# Patient Record
Sex: FEMALE | Race: WHITE | NOT HISPANIC OR LATINO | Employment: OTHER | URBAN - METROPOLITAN AREA
[De-identification: names, ages, dates, MRNs, and addresses within clinical notes are randomized per-mention and may not be internally consistent; named-entity substitution may affect disease eponyms.]

---

## 2021-01-07 ENCOUNTER — HOSPITAL ENCOUNTER (OUTPATIENT)
Dept: LAB | Facility: MEDICAL CENTER | Age: 86
End: 2021-01-07
Attending: FAMILY MEDICINE
Payer: MEDICARE

## 2021-01-07 LAB
ALBUMIN SERPL BCP-MCNC: 4 G/DL (ref 3.2–4.9)
ALBUMIN/GLOB SERPL: 1.1 G/DL
ALP SERPL-CCNC: 74 U/L (ref 30–99)
ALT SERPL-CCNC: 16 U/L (ref 2–50)
ANION GAP SERPL CALC-SCNC: 11 MMOL/L (ref 7–16)
AST SERPL-CCNC: 19 U/L (ref 12–45)
BILIRUB SERPL-MCNC: 0.9 MG/DL (ref 0.1–1.5)
BUN SERPL-MCNC: 19 MG/DL (ref 8–22)
CALCIUM SERPL-MCNC: 10.4 MG/DL (ref 8.5–10.5)
CHLORIDE SERPL-SCNC: 100 MMOL/L (ref 96–112)
CHOLEST SERPL-MCNC: 167 MG/DL (ref 100–199)
CO2 SERPL-SCNC: 21 MMOL/L (ref 20–33)
CREAT SERPL-MCNC: 0.86 MG/DL (ref 0.5–1.4)
FASTING STATUS PATIENT QL REPORTED: NORMAL
GLOBULIN SER CALC-MCNC: 3.5 G/DL (ref 1.9–3.5)
GLUCOSE SERPL-MCNC: 81 MG/DL (ref 65–99)
HDLC SERPL-MCNC: 53 MG/DL
LDLC SERPL CALC-MCNC: 96 MG/DL
POTASSIUM SERPL-SCNC: 3.8 MMOL/L (ref 3.6–5.5)
PROT SERPL-MCNC: 7.5 G/DL (ref 6–8.2)
SODIUM SERPL-SCNC: 132 MMOL/L (ref 135–145)
TRIGL SERPL-MCNC: 92 MG/DL (ref 0–149)

## 2021-01-07 PROCEDURE — 80061 LIPID PANEL: CPT

## 2021-01-07 PROCEDURE — 80053 COMPREHEN METABOLIC PANEL: CPT

## 2021-01-07 PROCEDURE — 36415 COLL VENOUS BLD VENIPUNCTURE: CPT

## 2021-01-14 DIAGNOSIS — Z23 NEED FOR VACCINATION: ICD-10-CM

## 2023-01-09 ENCOUNTER — TELEPHONE (OUTPATIENT)
Dept: SCHEDULING | Facility: IMAGING CENTER | Age: 88
End: 2023-01-09
Payer: MEDICARE

## 2023-01-11 ENCOUNTER — OFFICE VISIT (OUTPATIENT)
Dept: MEDICAL GROUP | Facility: PHYSICIAN GROUP | Age: 88
End: 2023-01-11
Payer: MEDICARE

## 2023-01-11 VITALS
DIASTOLIC BLOOD PRESSURE: 86 MMHG | BODY MASS INDEX: 22.36 KG/M2 | TEMPERATURE: 98.6 F | HEART RATE: 100 BPM | SYSTOLIC BLOOD PRESSURE: 154 MMHG | HEIGHT: 64 IN | RESPIRATION RATE: 14 BRPM | OXYGEN SATURATION: 91 % | WEIGHT: 131 LBS

## 2023-01-11 DIAGNOSIS — E55.9 VITAMIN D DEFICIENCY: ICD-10-CM

## 2023-01-11 DIAGNOSIS — N18.31 STAGE 3A CHRONIC KIDNEY DISEASE: ICD-10-CM

## 2023-01-11 DIAGNOSIS — I10 PRIMARY HYPERTENSION: ICD-10-CM

## 2023-01-11 DIAGNOSIS — Z13.29 SCREENING FOR THYROID DISORDER: ICD-10-CM

## 2023-01-11 DIAGNOSIS — Z13.1 DIABETES MELLITUS SCREENING: ICD-10-CM

## 2023-01-11 DIAGNOSIS — Z13.6 ENCOUNTER FOR LIPID SCREENING FOR CARDIOVASCULAR DISEASE: ICD-10-CM

## 2023-01-11 DIAGNOSIS — Z13.21 ENCOUNTER FOR VITAMIN DEFICIENCY SCREENING: ICD-10-CM

## 2023-01-11 DIAGNOSIS — Z13.220 ENCOUNTER FOR LIPID SCREENING FOR CARDIOVASCULAR DISEASE: ICD-10-CM

## 2023-01-11 DIAGNOSIS — E78.00 PURE HYPERCHOLESTEROLEMIA: ICD-10-CM

## 2023-01-11 DIAGNOSIS — R13.19 ESOPHAGEAL DYSPHAGIA: ICD-10-CM

## 2023-01-11 DIAGNOSIS — Q21.12 PFO (PATENT FORAMEN OVALE): ICD-10-CM

## 2023-01-11 PROBLEM — K40.90 RIGHT INGUINAL HERNIA: Status: ACTIVE | Noted: 2021-10-27

## 2023-01-11 PROBLEM — I87.2 VENOUS INSUFFICIENCY: Status: ACTIVE | Noted: 2021-10-26

## 2023-01-11 PROBLEM — R26.89 BALANCE PROBLEMS: Status: ACTIVE | Noted: 2021-10-27

## 2023-01-11 PROBLEM — I35.1 AORTIC REGURGITATION: Status: ACTIVE | Noted: 2021-10-26

## 2023-01-11 PROBLEM — N18.9 CKD (CHRONIC KIDNEY DISEASE): Status: ACTIVE | Noted: 2021-10-26

## 2023-01-11 PROCEDURE — 99204 OFFICE O/P NEW MOD 45 MIN: CPT | Performed by: INTERNAL MEDICINE

## 2023-01-11 RX ORDER — OLMESARTAN MEDOXOMIL AND HYDROCHLOROTHIAZIDE 40/25 40; 25 MG/1; MG/1
1 TABLET ORAL
COMMUNITY
Start: 2022-10-10 | End: 2023-10-12

## 2023-01-11 ASSESSMENT — PATIENT HEALTH QUESTIONNAIRE - PHQ9: CLINICAL INTERPRETATION OF PHQ2 SCORE: 0

## 2023-01-12 NOTE — PROGRESS NOTES
New Patient to establish    Chief Complaint   Patient presents with    Establish Care       Subjective:     History of Present Illness: Patient is a 89 y.o. female who is here today to establish primary care    Current Outpatient Medications on File Prior to Visit   Medication Sig Dispense Refill    olmesartan-hydrochlorothiazide (BENICAR HCT) 40-25 MG per tablet Take 1 Tablet by mouth every day.      furosemide (LASIX) 20 MG Tab Take 1 Tab by mouth 1 time daily as needed (as needed for swelling.). 30 Tab 0    ibuprofen (MOTRIN) 200 MG Tab Take 200 mg by mouth every 6 hours as needed.      Loratadine (CLARITIN PO) Take  by mouth.       No current facility-administered medications on file prior to visit.     Allergies   Allergen Reactions    Pcn [Penicillins]      Patient Active Problem List    Diagnosis Date Noted    Esophageal dysphagia 01/11/2023    Vitamin D deficiency 01/11/2023    Balance problems 10/27/2021    Right inguinal hernia 10/27/2021    Aortic regurgitation 10/26/2021    CKD (chronic kidney disease) 10/26/2021    PFO (patent foramen ovale) 10/26/2021    Venous insufficiency 10/26/2021    Bilateral edema of lower extremity 01/07/2016    Osteoarthritis 08/12/2014    Hyperlipidemia 08/12/2014    Nocturnal hypoxia 07/29/2014    Back pain 07/22/2014    Peripheral edema 04/27/2011    Seasonal allergies 04/27/2011    HTN (hypertension) 03/28/2011    History of breast cancer 03/28/2011     Past Medical History:   Diagnosis Date    Allergy     History of breast cancer      Hyperlipidemia     Hypertension August 2014    Echocardiogram with normal LV size, LVEF 65%. Mild AR, mild MR, mild TR.    Lymphedema of arm     Nocturnal hypoxia     Palpitations      Peripheral edema      Past Surgical History:   Procedure Laterality Date    ABDOMINAL HYSTERECTOMY TOTAL      non-cancer    LUMPECTOMY      breast cancer     Family History   Problem Relation Age of Onset    Other Mother         aneurysm    Heart Disease  "Father     Cancer Paternal Aunt         breast    Diabetes Neg Hx     Hyperlipidemia Neg Hx      Social History     Tobacco Use    Smoking status: Never    Smokeless tobacco: Never   Substance Use Topics    Alcohol use: No     Alcohol/week: 0.0 oz     Comment: none in decades    Drug use: No       ROS:  All other systems reviewed and are negative except as stated in the HPI       Physical Exam:     BP (!) 154/86 (BP Location: Left arm, Patient Position: Sitting, BP Cuff Size: Adult)   Pulse 100   Temp 37 °C (98.6 °F) (Temporal)   Resp 14   Ht 1.626 m (5' 4\")   Wt 59.4 kg (131 lb)   SpO2 91%   BMI 22.49 kg/m²   General: Normal appearing. No distress.  Pulmonary: Clear to ausculation.  Normal effort.   Cardiovascular: Regular rate and rhythm    Assessment and Plan:     2. Esophageal dysphagia  This is chronic, reported has work-up done in [facility] state status post esophageal dilatation, reported was seen by one of the GI office, she was very upset from that provider, she does not want to go back to that same office, she is open to the other GI office,  > Also reported some dark stool, denied having melena per se, she needs to have endoscopy to rule out any upper GI issues  -Few times she has some epigastric pain, as well as regurgitation, denied having acid reflux    > I will refer patient to GI office that she likes    Esophagogram in 2022   Mild narrowing of distal esophagus at the GE junction which did not permit the passage of 13 mm barium tablet.  Endoscopy should be considered.     - Comp Metabolic Panel; Future    3. Stage 3a chronic kidney disease (HCC)  - Comp Metabolic Panel; Future    4. Primary hypertension  She is checking blood pressure at home with normal range, she is taking olmesartan-hydrochlorothiazide 40-25 mg daily, she is also seeing cardiology, denies any symptoms related  - Comp Metabolic Panel; Future  - TSH WITH REFLEX TO FT4; Future    5. Pure hypercholesterolemia  - Lipid Profile; " Future    6. Vitamin D deficiency  - VITAMIN D,25 HYDROXY (DEFICIENCY); Future    7. Screening for thyroid disorder  - TSH WITH REFLEX TO FT4; Future    8. Encounter for vitamin deficiency screening  - VITAMIN B12; Future    Follow Up:      Return in about 4 weeks (around 2/8/2023) for labs.    Please note that this dictation was created using voice recognition software. I have made every reasonable attempt to correct obvious errors, but I expect that there are errors of grammar and possibly content that I did not discover before finalizing the note.    Signed by: Frederick Mack M.D.

## 2023-05-21 ENCOUNTER — TELEPHONE (OUTPATIENT)
Dept: MEDICAL GROUP | Facility: PHYSICIAN GROUP | Age: 88
End: 2023-05-21
Payer: MEDICARE

## 2023-05-21 NOTE — TELEPHONE ENCOUNTER
Phone Number Called: [phone] and  [phone]  Call outcome:  called patient to re schedule appt for 05/26/2023 [phone] disconnected  Unable to leave message on  [phone] voice mail not set up.    If patient calls back please re schedule patient for a different date.

## 2023-06-08 ENCOUNTER — DOCUMENTATION (OUTPATIENT)
Dept: HEALTH INFORMATION MANAGEMENT | Facility: OTHER | Age: 88
End: 2023-06-08
Payer: MEDICARE

## 2023-07-10 ENCOUNTER — OFFICE VISIT (OUTPATIENT)
Dept: MEDICAL GROUP | Facility: PHYSICIAN GROUP | Age: 88
End: 2023-07-10
Payer: MEDICARE

## 2023-07-10 VITALS
SYSTOLIC BLOOD PRESSURE: 122 MMHG | HEART RATE: 86 BPM | DIASTOLIC BLOOD PRESSURE: 76 MMHG | HEIGHT: 64 IN | OXYGEN SATURATION: 97 % | BODY MASS INDEX: 23.63 KG/M2 | TEMPERATURE: 97.6 F | WEIGHT: 138.4 LBS | RESPIRATION RATE: 16 BRPM

## 2023-07-10 DIAGNOSIS — E78.00 PURE HYPERCHOLESTEROLEMIA: ICD-10-CM

## 2023-07-10 DIAGNOSIS — N18.31 STAGE 3A CHRONIC KIDNEY DISEASE: ICD-10-CM

## 2023-07-10 DIAGNOSIS — J31.0 CHRONIC RHINITIS: ICD-10-CM

## 2023-07-10 DIAGNOSIS — Z85.3 HISTORY OF BREAST CANCER: ICD-10-CM

## 2023-07-10 DIAGNOSIS — I10 PRIMARY HYPERTENSION: ICD-10-CM

## 2023-07-10 PROCEDURE — 3074F SYST BP LT 130 MM HG: CPT | Performed by: NURSE PRACTITIONER

## 2023-07-10 PROCEDURE — 99214 OFFICE O/P EST MOD 30 MIN: CPT | Performed by: NURSE PRACTITIONER

## 2023-07-10 PROCEDURE — 3078F DIAST BP <80 MM HG: CPT | Performed by: NURSE PRACTITIONER

## 2023-07-10 RX ORDER — TRIAMCINOLONE ACETONIDE 40 MG/ML
40 INJECTION, SUSPENSION INTRA-ARTICULAR; INTRAMUSCULAR ONCE
Status: COMPLETED | OUTPATIENT
Start: 2023-07-10 | End: 2023-07-10

## 2023-07-10 RX ADMIN — TRIAMCINOLONE ACETONIDE 40 MG: 40 INJECTION, SUSPENSION INTRA-ARTICULAR; INTRAMUSCULAR at 11:57

## 2023-07-10 ASSESSMENT — FIBROSIS 4 INDEX: FIB4 SCORE: 0.96

## 2023-07-10 NOTE — ASSESSMENT & PLAN NOTE
Patient reports that when she was living in [place] she had a lumpectomy with lymph node removal.

## 2023-07-10 NOTE — ASSESSMENT & PLAN NOTE
Chronic unstable. Patient has had chronic rhinitis and post nasal drip. She recently saw an ENT and was provided with antibiotics.  Patient stopped antibiotics due to diarrhea.  Patient reports in the past she has seen an allergist who is giving her a Kenalog shot every 6 months.  Patient reports that her allergies responded well to this.    We will give patient Kenalog shot in office today.

## 2023-07-10 NOTE — ASSESSMENT & PLAN NOTE
Chronic stable. Patient has CKD stage 3 due to age. Her most recent labs are stable.     Will continue to monitor at least yearly.

## 2023-07-10 NOTE — ASSESSMENT & PLAN NOTE
Chronic stable condition.  Patient blood pressure is stable in office today.  Patient denies any chest pain, palpitations.    Continue olmesartan-hydrochlorothiazide 40-25 mg daily.

## 2023-07-10 NOTE — PROGRESS NOTES
"  Chief Complaint   Patient presents with    Establish Care     New to you     Gastrophageal Reflux    Body Aches     Joint pain since the 01/01/23 its gotten worse     Seasonal Allergies     Itching eyes, nose, bilateral ear itching x 4 months                                                                                                                                        HPI:   Carie presents today with the following.    Problem   Chronic Rhinitis   Ckd (Chronic Kidney Disease)   Hyperlipidemia   Htn (Hypertension)   History of Breast Cancer   Nocturnal Hypoxia (Resolved)       Current Outpatient Medications   Medication Sig Dispense Refill    olmesartan-hydrochlorothiazide (BENICAR HCT) 40-25 MG per tablet Take 1 Tablet by mouth every day.      Loratadine (CLARITIN PO) Take  by mouth.       No current facility-administered medications for this visit.       Allergies as of 07/10/2023 - Reviewed 07/10/2023   Allergen Reaction Noted    Pcn [penicillins]  07/14/2009        ROS:  All systems negative expect as addressed in assessment and plan.     /76 (BP Location: Left arm, Patient Position: Sitting, BP Cuff Size: Adult)   Pulse 86   Temp 36.4 °C (97.6 °F) (Temporal)   Resp 16   Ht 1.626 m (5' 4\")   Wt 62.8 kg (138 lb 6.4 oz)   SpO2 97%   BMI 23.76 kg/m²       Physical Exam  Vitals reviewed.   Constitutional:       Appearance: Normal appearance.   HENT:      Head: Normocephalic and atraumatic.      Right Ear: Tympanic membrane and ear canal normal.      Left Ear: Tympanic membrane and ear canal normal.      Mouth/Throat:      Mouth: Mucous membranes are moist.   Eyes:      Extraocular Movements: Extraocular movements intact.      Conjunctiva/sclera: Conjunctivae normal.   Pulmonary:      Effort: Pulmonary effort is normal.   Musculoskeletal:         General: Normal range of motion.      Cervical back: Normal range of motion.   Skin:     General: Skin is warm and dry.   Neurological:      General: No " focal deficit present.      Mental Status: She is alert and oriented to person, place, and time.   Psychiatric:         Mood and Affect: Mood normal.         Behavior: Behavior normal.         Thought Content: Thought content normal.           Assessment and Plan:  89 y.o. female with the following issues.    1. History of breast cancer        2. Chronic rhinitis  triamcinolone acetonide (Kenalog-40) injection 40 mg      3. Stage 3a chronic kidney disease (HCC)        4. Pure hypercholesterolemia        5. Primary hypertension             History of breast cancer  Patient reports that when she was living in [place] she had a lumpectomy with lymph node removal.       Chronic rhinitis  Chronic unstable. Patient has had chronic rhinitis and post nasal drip. She recently saw an ENT and was provided with antibiotics.  Patient stopped antibiotics due to diarrhea.  Patient reports in the past she has seen an allergist who is giving her a Kenalog shot every 6 months.  Patient reports that her allergies responded well to this.    We will give patient Kenalog shot in office today.    CKD (chronic kidney disease)  Chronic stable. Patient has CKD stage 3 due to age. Her most recent labs are stable.     Will continue to monitor at least yearly.     Hyperlipidemia  Chronic stable.  Patient follows with cardiology, Dr. Fabian.  Patient brought in most recent labs.    Labs and most recent note from Dr. Fabian reviewed.    Patient to continue following with cardiology.    HTN (hypertension)  Chronic stable condition.  Patient blood pressure is stable in office today.  Patient denies any chest pain, palpitations.    Continue olmesartan-hydrochlorothiazide 40-25 mg daily.      Return in about 6 months (around 1/10/2024) for follow up , Chronic Health Conditions.      Please note that this dictation was created using voice recognition software. I have worked with consultants from the vendor as well as technical experts from  [facility] to optimize the interface. I have made every reasonable attempt to correct obvious errors, but I expect that there are errors of grammar and possibly content that I did not discover before finalizing the note.

## 2023-07-10 NOTE — ASSESSMENT & PLAN NOTE
Chronic stable.  Patient follows with cardiology, Dr. Fabian.  Patient brought in most recent labs.    Labs and most recent note from Dr. Fabian reviewed.    Patient to continue following with cardiology.

## 2023-10-11 ENCOUNTER — HOSPITAL ENCOUNTER (INPATIENT)
Facility: MEDICAL CENTER | Age: 88
LOS: 2 days | DRG: 176 | End: 2023-10-14
Attending: STUDENT IN AN ORGANIZED HEALTH CARE EDUCATION/TRAINING PROGRAM | Admitting: STUDENT IN AN ORGANIZED HEALTH CARE EDUCATION/TRAINING PROGRAM
Payer: MEDICARE

## 2023-10-11 ENCOUNTER — APPOINTMENT (OUTPATIENT)
Dept: RADIOLOGY | Facility: MEDICAL CENTER | Age: 88
DRG: 176 | End: 2023-10-11
Attending: STUDENT IN AN ORGANIZED HEALTH CARE EDUCATION/TRAINING PROGRAM
Payer: MEDICARE

## 2023-10-11 DIAGNOSIS — I26.99 ACUTE PULMONARY EMBOLISM, UNSPECIFIED PULMONARY EMBOLISM TYPE, UNSPECIFIED WHETHER ACUTE COR PULMONALE PRESENT (HCC): ICD-10-CM

## 2023-10-11 LAB
ALBUMIN SERPL BCP-MCNC: 3.8 G/DL (ref 3.2–4.9)
ALBUMIN/GLOB SERPL: 1 G/DL
ALP SERPL-CCNC: 77 U/L (ref 30–99)
ALT SERPL-CCNC: 8 U/L (ref 2–50)
ANION GAP SERPL CALC-SCNC: 13 MMOL/L (ref 7–16)
AST SERPL-CCNC: 15 U/L (ref 12–45)
BASOPHILS # BLD AUTO: 0.4 % (ref 0–1.8)
BASOPHILS # BLD: 0.07 K/UL (ref 0–0.12)
BILIRUB SERPL-MCNC: 1.2 MG/DL (ref 0.1–1.5)
BUN SERPL-MCNC: 40 MG/DL (ref 8–22)
CALCIUM ALBUM COR SERPL-MCNC: 10.6 MG/DL (ref 8.5–10.5)
CALCIUM SERPL-MCNC: 10.4 MG/DL (ref 8.5–10.5)
CHLORIDE SERPL-SCNC: 98 MMOL/L (ref 96–112)
CO2 SERPL-SCNC: 22 MMOL/L (ref 20–33)
CREAT SERPL-MCNC: 1.8 MG/DL (ref 0.5–1.4)
D DIMER PPP IA.FEU-MCNC: 3.96 UG/ML (FEU) (ref 0–0.5)
EKG IMPRESSION: NORMAL
EOSINOPHIL # BLD AUTO: 0.33 K/UL (ref 0–0.51)
EOSINOPHIL NFR BLD: 2 % (ref 0–6.9)
ERYTHROCYTE [DISTWIDTH] IN BLOOD BY AUTOMATED COUNT: 42.9 FL (ref 35.9–50)
GFR SERPLBLD CREATININE-BSD FMLA CKD-EPI: 26 ML/MIN/1.73 M 2
GLOBULIN SER CALC-MCNC: 3.7 G/DL (ref 1.9–3.5)
GLUCOSE SERPL-MCNC: 123 MG/DL (ref 65–99)
HCT VFR BLD AUTO: 34.7 % (ref 37–47)
HGB BLD-MCNC: 11.4 G/DL (ref 12–16)
IMM GRANULOCYTES # BLD AUTO: 0.11 K/UL (ref 0–0.11)
IMM GRANULOCYTES NFR BLD AUTO: 0.7 % (ref 0–0.9)
LIPASE SERPL-CCNC: 25 U/L (ref 11–82)
LYMPHOCYTES # BLD AUTO: 3.47 K/UL (ref 1–4.8)
LYMPHOCYTES NFR BLD: 21 % (ref 22–41)
MAGNESIUM SERPL-MCNC: 1.9 MG/DL (ref 1.5–2.5)
MCH RBC QN AUTO: 29.5 PG (ref 27–33)
MCHC RBC AUTO-ENTMCNC: 32.9 G/DL (ref 32.2–35.5)
MCV RBC AUTO: 89.7 FL (ref 81.4–97.8)
MONOCYTES # BLD AUTO: 1.72 K/UL (ref 0–0.85)
MONOCYTES NFR BLD AUTO: 10.4 % (ref 0–13.4)
NEUTROPHILS # BLD AUTO: 10.81 K/UL (ref 1.82–7.42)
NEUTROPHILS NFR BLD: 65.5 % (ref 44–72)
NRBC # BLD AUTO: 0 K/UL
NRBC BLD-RTO: 0 /100 WBC (ref 0–0.2)
PLATELET # BLD AUTO: 240 K/UL (ref 164–446)
PMV BLD AUTO: 9.6 FL (ref 9–12.9)
POTASSIUM SERPL-SCNC: 3.4 MMOL/L (ref 3.6–5.5)
PROT SERPL-MCNC: 7.5 G/DL (ref 6–8.2)
RBC # BLD AUTO: 3.87 M/UL (ref 4.2–5.4)
SODIUM SERPL-SCNC: 133 MMOL/L (ref 135–145)
TROPONIN T SERPL-MCNC: 17 NG/L (ref 6–19)
TROPONIN T SERPL-MCNC: 19 NG/L (ref 6–19)
WBC # BLD AUTO: 16.5 K/UL (ref 4.8–10.8)

## 2023-10-11 PROCEDURE — 83735 ASSAY OF MAGNESIUM: CPT

## 2023-10-11 PROCEDURE — 700102 HCHG RX REV CODE 250 W/ 637 OVERRIDE(OP): Mod: JZ | Performed by: STUDENT IN AN ORGANIZED HEALTH CARE EDUCATION/TRAINING PROGRAM

## 2023-10-11 PROCEDURE — 96375 TX/PRO/DX INJ NEW DRUG ADDON: CPT

## 2023-10-11 PROCEDURE — 93005 ELECTROCARDIOGRAM TRACING: CPT

## 2023-10-11 PROCEDURE — 36415 COLL VENOUS BLD VENIPUNCTURE: CPT

## 2023-10-11 PROCEDURE — 84484 ASSAY OF TROPONIN QUANT: CPT

## 2023-10-11 PROCEDURE — 93005 ELECTROCARDIOGRAM TRACING: CPT | Performed by: STUDENT IN AN ORGANIZED HEALTH CARE EDUCATION/TRAINING PROGRAM

## 2023-10-11 PROCEDURE — 99285 EMERGENCY DEPT VISIT HI MDM: CPT

## 2023-10-11 PROCEDURE — 96376 TX/PRO/DX INJ SAME DRUG ADON: CPT

## 2023-10-11 PROCEDURE — 85379 FIBRIN DEGRADATION QUANT: CPT

## 2023-10-11 PROCEDURE — 700105 HCHG RX REV CODE 258: Performed by: STUDENT IN AN ORGANIZED HEALTH CARE EDUCATION/TRAINING PROGRAM

## 2023-10-11 PROCEDURE — 80053 COMPREHEN METABOLIC PANEL: CPT

## 2023-10-11 PROCEDURE — 700111 HCHG RX REV CODE 636 W/ 250 OVERRIDE (IP): Mod: JZ | Performed by: STUDENT IN AN ORGANIZED HEALTH CARE EDUCATION/TRAINING PROGRAM

## 2023-10-11 PROCEDURE — 71045 X-RAY EXAM CHEST 1 VIEW: CPT

## 2023-10-11 PROCEDURE — 83690 ASSAY OF LIPASE: CPT

## 2023-10-11 PROCEDURE — 85025 COMPLETE CBC W/AUTO DIFF WBC: CPT

## 2023-10-11 PROCEDURE — 83880 ASSAY OF NATRIURETIC PEPTIDE: CPT

## 2023-10-11 PROCEDURE — A9270 NON-COVERED ITEM OR SERVICE: HCPCS | Mod: JZ | Performed by: STUDENT IN AN ORGANIZED HEALTH CARE EDUCATION/TRAINING PROGRAM

## 2023-10-11 RX ORDER — SODIUM CHLORIDE, SODIUM LACTATE, POTASSIUM CHLORIDE, CALCIUM CHLORIDE 600; 310; 30; 20 MG/100ML; MG/100ML; MG/100ML; MG/100ML
1000 INJECTION, SOLUTION INTRAVENOUS ONCE
Status: COMPLETED | OUTPATIENT
Start: 2023-10-11 | End: 2023-10-12

## 2023-10-11 RX ORDER — MORPHINE SULFATE 4 MG/ML
4 INJECTION INTRAVENOUS ONCE
Status: COMPLETED | OUTPATIENT
Start: 2023-10-11 | End: 2023-10-11

## 2023-10-11 RX ORDER — ONDANSETRON 2 MG/ML
4 INJECTION INTRAMUSCULAR; INTRAVENOUS ONCE
Status: COMPLETED | OUTPATIENT
Start: 2023-10-11 | End: 2023-10-11

## 2023-10-11 RX ORDER — POTASSIUM CHLORIDE 20 MEQ/1
40 TABLET, EXTENDED RELEASE ORAL ONCE
Status: COMPLETED | OUTPATIENT
Start: 2023-10-11 | End: 2023-10-11

## 2023-10-11 RX ADMIN — ONDANSETRON 4 MG: 2 INJECTION INTRAMUSCULAR; INTRAVENOUS at 19:31

## 2023-10-11 RX ADMIN — SODIUM CHLORIDE, POTASSIUM CHLORIDE, SODIUM LACTATE AND CALCIUM CHLORIDE 1000 ML: 600; 310; 30; 20 INJECTION, SOLUTION INTRAVENOUS at 23:23

## 2023-10-11 RX ADMIN — POTASSIUM CHLORIDE 40 MEQ: 1500 TABLET, EXTENDED RELEASE ORAL at 21:29

## 2023-10-11 RX ADMIN — ONDANSETRON 4 MG: 2 INJECTION INTRAMUSCULAR; INTRAVENOUS at 23:23

## 2023-10-11 RX ADMIN — MORPHINE SULFATE 4 MG: 4 INJECTION, SOLUTION INTRAMUSCULAR; INTRAVENOUS at 23:23

## 2023-10-11 RX ADMIN — FENTANYL CITRATE 50 MCG: 50 INJECTION, SOLUTION INTRAMUSCULAR; INTRAVENOUS at 21:30

## 2023-10-11 ASSESSMENT — PAIN DESCRIPTION - PAIN TYPE: TYPE: ACUTE PAIN

## 2023-10-11 ASSESSMENT — FIBROSIS 4 INDEX: FIB4 SCORE: 1.99

## 2023-10-12 ENCOUNTER — APPOINTMENT (OUTPATIENT)
Dept: RADIOLOGY | Facility: MEDICAL CENTER | Age: 88
DRG: 176 | End: 2023-10-12
Attending: STUDENT IN AN ORGANIZED HEALTH CARE EDUCATION/TRAINING PROGRAM
Payer: MEDICARE

## 2023-10-12 PROBLEM — N17.9 ACUTE KIDNEY INJURY SUPERIMPOSED ON CHRONIC KIDNEY DISEASE (HCC): Status: ACTIVE | Noted: 2021-10-26

## 2023-10-12 PROBLEM — I26.99 BILATERAL PULMONARY EMBOLISM (HCC): Status: ACTIVE | Noted: 2023-10-12

## 2023-10-12 PROBLEM — N28.89 RENAL MASS: Status: ACTIVE | Noted: 2023-10-12

## 2023-10-12 PROBLEM — R07.81 PLEURITIC CHEST PAIN: Status: ACTIVE | Noted: 2023-10-12

## 2023-10-12 PROBLEM — K21.00 GASTROESOPHAGEAL REFLUX DISEASE WITH ESOPHAGITIS WITHOUT HEMORRHAGE: Status: ACTIVE | Noted: 2023-10-12

## 2023-10-12 LAB
APTT PPP: 23.4 SEC (ref 24.7–36)
INR PPP: 1.12 (ref 0.87–1.13)
MAGNESIUM SERPL-MCNC: 1.7 MG/DL (ref 1.5–2.5)
NT-PROBNP SERPL IA-MCNC: 720 PG/ML (ref 0–125)
PROTHROMBIN TIME: 14.5 SEC (ref 12–14.6)
UFH PPP CHRO-ACNC: 0.41 IU/ML
UFH PPP CHRO-ACNC: 0.41 IU/ML
UFH PPP CHRO-ACNC: 0.76 IU/ML
UFH PPP CHRO-ACNC: <0.1 IU/ML

## 2023-10-12 PROCEDURE — 74177 CT ABD & PELVIS W/CONTRAST: CPT

## 2023-10-12 PROCEDURE — 99291 CRITICAL CARE FIRST HOUR: CPT | Mod: 25 | Performed by: STUDENT IN AN ORGANIZED HEALTH CARE EDUCATION/TRAINING PROGRAM

## 2023-10-12 PROCEDURE — 96365 THER/PROPH/DIAG IV INF INIT: CPT

## 2023-10-12 PROCEDURE — 700105 HCHG RX REV CODE 258: Performed by: STUDENT IN AN ORGANIZED HEALTH CARE EDUCATION/TRAINING PROGRAM

## 2023-10-12 PROCEDURE — 700111 HCHG RX REV CODE 636 W/ 250 OVERRIDE (IP): Performed by: STUDENT IN AN ORGANIZED HEALTH CARE EDUCATION/TRAINING PROGRAM

## 2023-10-12 PROCEDURE — 83735 ASSAY OF MAGNESIUM: CPT

## 2023-10-12 PROCEDURE — 85520 HEPARIN ASSAY: CPT

## 2023-10-12 PROCEDURE — 76775 US EXAM ABDO BACK WALL LIM: CPT

## 2023-10-12 PROCEDURE — 85730 THROMBOPLASTIN TIME PARTIAL: CPT

## 2023-10-12 PROCEDURE — 99223 1ST HOSP IP/OBS HIGH 75: CPT | Mod: AI | Performed by: STUDENT IN AN ORGANIZED HEALTH CARE EDUCATION/TRAINING PROGRAM

## 2023-10-12 PROCEDURE — 700117 HCHG RX CONTRAST REV CODE 255: Performed by: STUDENT IN AN ORGANIZED HEALTH CARE EDUCATION/TRAINING PROGRAM

## 2023-10-12 PROCEDURE — 36415 COLL VENOUS BLD VENIPUNCTURE: CPT

## 2023-10-12 PROCEDURE — 96366 THER/PROPH/DIAG IV INF ADDON: CPT

## 2023-10-12 PROCEDURE — 71275 CT ANGIOGRAPHY CHEST: CPT

## 2023-10-12 PROCEDURE — 700102 HCHG RX REV CODE 250 W/ 637 OVERRIDE(OP): Mod: JZ | Performed by: STUDENT IN AN ORGANIZED HEALTH CARE EDUCATION/TRAINING PROGRAM

## 2023-10-12 PROCEDURE — 85610 PROTHROMBIN TIME: CPT

## 2023-10-12 PROCEDURE — A9270 NON-COVERED ITEM OR SERVICE: HCPCS | Mod: JZ | Performed by: STUDENT IN AN ORGANIZED HEALTH CARE EDUCATION/TRAINING PROGRAM

## 2023-10-12 PROCEDURE — 96375 TX/PRO/DX INJ NEW DRUG ADDON: CPT

## 2023-10-12 PROCEDURE — 700111 HCHG RX REV CODE 636 W/ 250 OVERRIDE (IP): Mod: JZ | Performed by: STUDENT IN AN ORGANIZED HEALTH CARE EDUCATION/TRAINING PROGRAM

## 2023-10-12 PROCEDURE — 770020 HCHG ROOM/CARE - TELE (206)

## 2023-10-12 RX ORDER — DEXTROMETHORPHAN HBR, GUAIFENESIN 5; 100 MG/5ML; MG/5ML
1 SOLUTION ORAL
COMMUNITY
End: 2023-12-04

## 2023-10-12 RX ORDER — SODIUM CHLORIDE 9 MG/ML
INJECTION, SOLUTION INTRAVENOUS CONTINUOUS
Status: DISCONTINUED | OUTPATIENT
Start: 2023-10-12 | End: 2023-10-14

## 2023-10-12 RX ORDER — HEPARIN SODIUM 1000 [USP'U]/ML
40 INJECTION, SOLUTION INTRAVENOUS; SUBCUTANEOUS PRN
Status: DISCONTINUED | OUTPATIENT
Start: 2023-10-12 | End: 2023-10-12

## 2023-10-12 RX ORDER — HEPARIN SODIUM 5000 [USP'U]/100ML
0-30 INJECTION, SOLUTION INTRAVENOUS CONTINUOUS
Status: DISCONTINUED | OUTPATIENT
Start: 2023-10-12 | End: 2023-10-12

## 2023-10-12 RX ORDER — POTASSIUM CHLORIDE 20 MEQ/1
20 TABLET, EXTENDED RELEASE ORAL ONCE
Status: COMPLETED | OUTPATIENT
Start: 2023-10-12 | End: 2023-10-12

## 2023-10-12 RX ORDER — HEPARIN SODIUM 1000 [USP'U]/ML
80 INJECTION, SOLUTION INTRAVENOUS; SUBCUTANEOUS ONCE
Status: COMPLETED | OUTPATIENT
Start: 2023-10-12 | End: 2023-10-12

## 2023-10-12 RX ORDER — AMOXICILLIN 250 MG
2 CAPSULE ORAL 2 TIMES DAILY
Status: DISCONTINUED | OUTPATIENT
Start: 2023-10-12 | End: 2023-10-14

## 2023-10-12 RX ORDER — POLYETHYLENE GLYCOL 3350 17 G/17G
1 POWDER, FOR SOLUTION ORAL
Status: DISCONTINUED | OUTPATIENT
Start: 2023-10-12 | End: 2023-10-14

## 2023-10-12 RX ORDER — ACETAMINOPHEN 325 MG/1
650 TABLET ORAL EVERY 6 HOURS PRN
Status: DISCONTINUED | OUTPATIENT
Start: 2023-10-12 | End: 2023-10-14 | Stop reason: HOSPADM

## 2023-10-12 RX ORDER — BISACODYL 10 MG
10 SUPPOSITORY, RECTAL RECTAL
Status: DISCONTINUED | OUTPATIENT
Start: 2023-10-12 | End: 2023-10-14

## 2023-10-12 RX ORDER — BENZONATATE 100 MG/1
100 CAPSULE ORAL 3 TIMES DAILY PRN
Status: DISCONTINUED | OUTPATIENT
Start: 2023-10-12 | End: 2023-10-14 | Stop reason: HOSPADM

## 2023-10-12 RX ORDER — OLMESARTAN MEDOXOMIL 20 MG/1
40 TABLET ORAL DAILY
COMMUNITY

## 2023-10-12 RX ORDER — HYDRALAZINE HYDROCHLORIDE 20 MG/ML
10 INJECTION INTRAMUSCULAR; INTRAVENOUS EVERY 4 HOURS PRN
Status: DISCONTINUED | OUTPATIENT
Start: 2023-10-12 | End: 2023-10-14 | Stop reason: HOSPADM

## 2023-10-12 RX ORDER — HEPARIN SODIUM 5000 [USP'U]/100ML
0-30 INJECTION, SOLUTION INTRAVENOUS CONTINUOUS
Status: DISCONTINUED | OUTPATIENT
Start: 2023-10-12 | End: 2023-10-14

## 2023-10-12 RX ORDER — OMEPRAZOLE 20 MG/1
20 CAPSULE, DELAYED RELEASE ORAL DAILY
Status: DISCONTINUED | OUTPATIENT
Start: 2023-10-12 | End: 2023-10-13

## 2023-10-12 RX ORDER — VITAMIN B COMPLEX
1000 TABLET ORAL DAILY
COMMUNITY

## 2023-10-12 RX ORDER — HEPARIN SODIUM 1000 [USP'U]/ML
40 INJECTION, SOLUTION INTRAVENOUS; SUBCUTANEOUS PRN
Status: DISCONTINUED | OUTPATIENT
Start: 2023-10-12 | End: 2023-10-14

## 2023-10-12 RX ADMIN — HEPARIN SODIUM 4700 UNITS: 1000 INJECTION, SOLUTION INTRAVENOUS; SUBCUTANEOUS at 02:22

## 2023-10-12 RX ADMIN — OMEPRAZOLE 20 MG: 20 CAPSULE, DELAYED RELEASE ORAL at 06:13

## 2023-10-12 RX ADMIN — POTASSIUM CHLORIDE 20 MEQ: 1500 TABLET, EXTENDED RELEASE ORAL at 02:21

## 2023-10-12 RX ADMIN — HEPARIN SODIUM 18 UNITS/KG/HR: 5000 INJECTION, SOLUTION INTRAVENOUS at 02:41

## 2023-10-12 RX ADMIN — DOCUSATE SODIUM 50 MG AND SENNOSIDES 8.6 MG 2 TABLET: 8.6; 5 TABLET, FILM COATED ORAL at 19:07

## 2023-10-12 RX ADMIN — SODIUM CHLORIDE: 9 INJECTION, SOLUTION INTRAVENOUS at 02:21

## 2023-10-12 RX ADMIN — IOHEXOL 100 ML: 350 INJECTION, SOLUTION INTRAVENOUS at 00:09

## 2023-10-12 ASSESSMENT — ENCOUNTER SYMPTOMS
FEVER: 0
GASTROINTESTINAL NEGATIVE: 1
DIARRHEA: 0
PSYCHIATRIC NEGATIVE: 1
NAUSEA: 0
SHORTNESS OF BREATH: 0
EYES NEGATIVE: 1
PALPITATIONS: 1
SHORTNESS OF BREATH: 1
VOMITING: 0
ABDOMINAL PAIN: 0
WEAKNESS: 1
CHILLS: 0
COUGH: 0
MYALGIAS: 1

## 2023-10-12 ASSESSMENT — COGNITIVE AND FUNCTIONAL STATUS - GENERAL
SUGGESTED CMS G CODE MODIFIER DAILY ACTIVITY: CJ
CLIMB 3 TO 5 STEPS WITH RAILING: A LITTLE
DAILY ACTIVITIY SCORE: 21
DRESSING REGULAR LOWER BODY CLOTHING: A LITTLE
SUGGESTED CMS G CODE MODIFIER MOBILITY: CJ
HELP NEEDED FOR BATHING: A LITTLE
WALKING IN HOSPITAL ROOM: A LITTLE
TOILETING: A LITTLE
MOBILITY SCORE: 22

## 2023-10-12 ASSESSMENT — LIFESTYLE VARIABLES
AVERAGE NUMBER OF DAYS PER WEEK YOU HAVE A DRINK CONTAINING ALCOHOL: 0
EVER FELT BAD OR GUILTY ABOUT YOUR DRINKING: NO
TOTAL SCORE: 0
HOW MANY TIMES IN THE PAST YEAR HAVE YOU HAD 5 OR MORE DRINKS IN A DAY: 0
EVER HAD A DRINK FIRST THING IN THE MORNING TO STEADY YOUR NERVES TO GET RID OF A HANGOVER: NO
ALCOHOL_USE: NO
DOES PATIENT WANT TO STOP DRINKING: NO
ON A TYPICAL DAY WHEN YOU DRINK ALCOHOL HOW MANY DRINKS DO YOU HAVE: 0
HAVE PEOPLE ANNOYED YOU BY CRITICIZING YOUR DRINKING: NO
CONSUMPTION TOTAL: NEGATIVE
TOTAL SCORE: 0
TOTAL SCORE: 0
HAVE YOU EVER FELT YOU SHOULD CUT DOWN ON YOUR DRINKING: NO

## 2023-10-12 ASSESSMENT — PAIN DESCRIPTION - PAIN TYPE
TYPE: ACUTE PAIN
TYPE: CHRONIC PAIN

## 2023-10-12 ASSESSMENT — PATIENT HEALTH QUESTIONNAIRE - PHQ9
2. FEELING DOWN, DEPRESSED, IRRITABLE, OR HOPELESS: NOT AT ALL
SUM OF ALL RESPONSES TO PHQ9 QUESTIONS 1 AND 2: 0
1. LITTLE INTEREST OR PLEASURE IN DOING THINGS: NOT AT ALL

## 2023-10-12 NOTE — ED NOTES
Bedside report received by ANTONIO Garcia    Oxygen:2L NC  Fall Risk status: sign on door, bed in lowest position/locked, call light within reach  Patient Mentation:A+O x 4  Continuous cardiac monitoring: NSR

## 2023-10-12 NOTE — H&P
Hospital Medicine History & Physical Note    Date of Service  10/12/2023    Primary Care Physician  COSTA Guillaume.    Code Status  Full Code    Chief Complaint  Chief Complaint   Patient presents with    Chest Pain     Starting this AM, intermittent then constant, described as sharp, non-radiating, worse when pressing on chest, denies cardiac hx, pt received 1 nitro tab en route without relief       History of Presenting Illness  Carie Kulkarni is a 89 y.o. female who presented 10/11/2023 with chest pain.  Patient comes in complaining of chest pain has been going on intermittently throughout the day, nonradiating and sharp in nature.  Denies shortness of breath, fever/chills or other acute complaints.  CTA in the ED showed bilateral PE and she was started on heparin drip.  She says she has had a history of ulcers and had an EGD in the past, she said there was no bleeding at the time of the scope she had inflammation and stenosis in her esophagus and still has dysphagia.    I discussed the plan of care with patient and EDP .    Review of Systems  Review of Systems   Constitutional:  Negative for chills and fever.   Respiratory:  Negative for cough and shortness of breath.    Cardiovascular:  Positive for chest pain.   Gastrointestinal:  Negative for abdominal pain, diarrhea, nausea and vomiting.   Genitourinary:  Negative for dysuria and urgency.       Past Medical History   has a past medical history of Allergy, History of breast cancer ( ), Hyperlipidemia, Hypertension (August 2014), Lymphedema of arm, Nocturnal hypoxia, Palpitations ( ), and Peripheral edema.    Surgical History   has a past surgical history that includes abdominal hysterectomy total and lumpectomy.     Family History  family history includes Abdominal aortic aneurysm in her mother; Breast Cancer in her paternal grandmother; Cancer in her paternal grandmother; Heart Disease in her father; Kidney Disease in her father; Other in her  mother.   Family history reviewed with patient. There is no family history that is pertinent to the chief complaint.     Social History   reports that she has never smoked. She has never used smokeless tobacco. She reports that she does not drink alcohol and does not use drugs.    Allergies  Allergies   Allergen Reactions    Pcn [Penicillins]        Medications  Prior to Admission Medications   Prescriptions Last Dose Informant Patient Reported? Taking?   Loratadine (CLARITIN PO)   Yes No   Sig: Take  by mouth.   olmesartan-hydrochlorothiazide (BENICAR HCT) 40-25 MG per tablet   Yes No   Sig: Take 1 Tablet by mouth every day.      Facility-Administered Medications: None       Physical Exam  Temp:  [36.6 °C (97.8 °F)] 36.6 °C (97.8 °F)  Pulse:  [77-93] 86  Resp:  [16-22] 17  BP: ()/(50-71) 127/58  SpO2:  [91 %-97 %] 91 %  Blood Pressure : 118/64   Temperature: 36.6 °C (97.8 °F)   Pulse: 81   Respiration: 20   Pulse Oximetry: 96 %       Physical Exam  Constitutional:       Appearance: Normal appearance.   HENT:      Head: Normocephalic and atraumatic.      Mouth/Throat:      Mouth: Mucous membranes are moist.      Pharynx: No oropharyngeal exudate or posterior oropharyngeal erythema.   Cardiovascular:      Rate and Rhythm: Normal rate and regular rhythm.      Pulses: Normal pulses.      Heart sounds: Normal heart sounds. No murmur heard.  Pulmonary:      Effort: Pulmonary effort is normal. No respiratory distress.      Breath sounds: Normal breath sounds.   Musculoskeletal:         General: No swelling or tenderness. Normal range of motion.   Skin:     General: Skin is warm and dry.   Neurological:      General: No focal deficit present.      Mental Status: She is alert and oriented to person, place, and time.   Psychiatric:         Mood and Affect: Mood normal.         Laboratory:  Recent Labs     10/11/23  1905   WBC 16.5*   RBC 3.87*   HEMOGLOBIN 11.4*   HEMATOCRIT 34.7*   MCV 89.7   MCH 29.5   MCHC 32.9    RDW 42.9   PLATELETCT 240   MPV 9.6     Recent Labs     10/11/23  1905   SODIUM 133*   POTASSIUM 3.4*   CHLORIDE 98   CO2 22   GLUCOSE 123*   BUN 40*   CREATININE 1.80*   CALCIUM 10.4     Recent Labs     10/11/23  1905   ALTSGPT 8   ASTSGOT 15   ALKPHOSPHAT 77   TBILIRUBIN 1.2   LIPASE 25   GLUCOSE 123*     Recent Labs     10/12/23  0110   APTT 23.4*   INR 1.12     Recent Labs     10/11/23  2047   NTPROBNP 720*         Recent Labs     10/11/23  1905 10/11/23  2047   TROPONINT 19 17       Imaging:  CT-ABDOMEN-PELVIS WITH   Final Result      1.  No evidence of acute inflammatory process in the abdomen or pelvis   2.  1 cm RIGHT upper pole renal mass suspicious for renal cell carcinoma of this could be proteinaceous or hemorrhagic cyst. This could be further evaluated with ultrasound when clinically appropriate.   3.  9 mm LEFT adrenal nodule unchanged since at least March 2005 and very likely a benign adenoma   4.  Atherosclerosis   5.  Colonic diverticulosis      CT-CTA CHEST PULMONARY ARTERY W/ RECONS   Final Result      1.  BILATERAL central pulmonary emboli   2.  Mildly elevated RV/LV ratio could indicate RIGHT heart strain   3.  Atherosclerosis   Findings were communicated with and acknowledged by ANAIS GURROLA via [employer] on 10/12/2023 12:16 AM.            DX-CHEST-PORTABLE (1 VIEW)   Final Result      Mild interstitial prominence could be chronic changes and/or mild vascular congestion.      Hazy left basilar opacity, atelectasis and/or edema. Cannot exclude trace left pleural effusion.      EC-ECHOCARDIOGRAM COMPLETE W/O CONT    (Results Pending)   US-RENAL    (Results Pending)       X-Ray:  I have personally reviewed the images and compared with prior images. and My impression is: Interstitial opacities  EKG:  I have personally reviewed the images and compared with prior images. and My impression is: NSR    Assessment/Plan:  Justification for Admission Status  I anticipate this patient will  require at least two midnights for appropriate medical management, necessitating inpatient admission because PE    * Bilateral pulmonary embolism (HCC)- (present on admission)  Assessment & Plan  Patient has bilateral central PE, came in complaining of intermittent chest pain for the past day  She was hypotensive on presentation to the ED with systolic 80s/90s but improved to the low 100s on my evaluation  Started on heparin drip, transition to DOAC in the morning  Monitor for signs of bleeding, she does that she has a history of ulcers and esophagitis  Patient says she has not been moving much lately and thinks decreased activity is playing a role.  She does have a mass incidentally found on CT scan in her right kidney, would be concerning for cancer due to presence of acute PE    Acute kidney injury superimposed on chronic kidney disease (HCC)- (present on admission)  Assessment & Plan  Creatinine on presentation 1.8, previously was at baseline 0.86-1  Unclear etiology at this time, says she is in her normal state of health until chest pain began yesterday  BMP ordered to monitor kidney function daily, avoid nephrotoxic agents, IV fluids    Renal mass- (present on admission)  Assessment & Plan  1 cm right renal mass concerning for renal cell carcinoma versus hemorrhagic cyst versus other cause  Ultrasound ordered    Gastroesophageal reflux disease with esophagitis without hemorrhage- (present on admission)  Assessment & Plan  Says she had a scope in the past and was told she had inflammation and narrowing of her esophagus.  Takes over-the-counter antacid but does not recall the name  Will be on anticoagulation for PE, started on PPI    Pleuritic chest pain- (present on admission)  Assessment & Plan  Secondary to PE.  Troponin negative x2, EKG not concerning for acute ischemia  Will be monitored on telemetry      Patient is critically ill.   The patient continues to have: Bilateral pulmonary embolism  If untreated  there is a high chance of deterioration And eventually death.   The critical care that I am providing today is: Extensive chart review including notes, labs, imaging, EKG and interpretation.  Continue patient on heparin drip and needs very close monitoring of her hemodynamic status and monitoring for any signs of bleeding due to history of GERD with esophagitis.  Frequent monitoring of labs for heparin for titration as needed.  She also had low blood pressures on presentation secondary to the pulmonary embolism and needs close hemodynamic monitoring.  The critical that has been undertaken is medically complex.   There has been no overlap in critical care time.   Critical Care Time not including procedures: 43 minutes      VTE prophylaxis: therapeutic anticoagulation with heparin

## 2023-10-12 NOTE — ED NOTES
Med rec complete per patient  Allergies reviewed.   Patient denies any prescription medications the last 30 days    Preferred pharmacy for this visit - [facility] on [address] Adelia Guzman, CPhT

## 2023-10-12 NOTE — ASSESSMENT & PLAN NOTE
Likely unprovoked PE   came with pleuritic chest pain and shortness of breath   Patient was found to have hypoxia   Patient is on 2 L nasal cannula, patient needs home oxygen on discharge  Blood pressure was low 90s on admission however later improved   Initially heparin drip started and later switched to Eliquis, tolerating  Echo is pending

## 2023-10-12 NOTE — ED PROVIDER NOTES
ED Provider Note    CHIEF COMPLAINT  Chief Complaint   Patient presents with    Chest Pain     Starting this AM, intermittent then constant, described as sharp, non-radiating, worse when pressing on chest, denies cardiac hx, pt received 1 nitro tab en route without relief       EXTERNAL RECORDS REVIEWED  Outpatient Notes from 7/10    HPI/ROS  LIMITATION TO HISTORY     OUTSIDE HISTORIAN(S):      Carie Kulkarni is a 89 y.o. female who presents with chest pain.  Patient says that this afternoon she developed anterior chest pain with radiation to her back.  Patient says that she feels nauseated some intermittent shortness of breath.  Patient denies other recent illness or trauma.  Patient denies fever, chills, cough, vomiting, diarrhea, bloody stool, urinary changes.    PAST MEDICAL HISTORY   has a past medical history of Allergy, History of breast cancer ( ), Hyperlipidemia, Hypertension (August 2014), Lymphedema of arm, Nocturnal hypoxia, Palpitations ( ), and Peripheral edema.    SURGICAL HISTORY   has a past surgical history that includes abdominal hysterectomy total and lumpectomy.    FAMILY HISTORY  Family History   Problem Relation Age of Onset    Other Mother         aneurysm    Abdominal aortic aneurysm Mother     Heart Disease Father     Kidney Disease Father     Breast Cancer Paternal Grandmother     Cancer Paternal Grandmother     Diabetes Neg Hx     Hyperlipidemia Neg Hx        SOCIAL HISTORY  Social History     Tobacco Use    Smoking status: Never    Smokeless tobacco: Never   Substance and Sexual Activity    Alcohol use: No     Alcohol/week: 0.0 oz     Comment: none in decades    Drug use: No    Sexual activity: Not Currently     Birth control/protection: Female Sterilization       CURRENT MEDICATIONS  Home Medications       Reviewed by Jose Conn R.N. (Registered Nurse) on 10/11/23 at 1857  Med List Status: Not Addressed     Medication Last Dose Status   Loratadine (CLARITIN PO)  Active  "  olmesartan-hydrochlorothiazide (BENICAR HCT) 40-25 MG per tablet  Active                    ALLERGIES  Allergies   Allergen Reactions    Pcn [Penicillins]        PHYSICAL EXAM  VITAL SIGNS: /64   Pulse 81   Temp 36.6 °C (97.8 °F) (Temporal)   Resp 20   Ht 1.626 m (5' 4\")   Wt 59.1 kg (130 lb 4.7 oz)   SpO2 96%   BMI 22.36 kg/m²    No acute distress, clear bilateral breath sounds, normal heart sounds, normal peripheral pulses, abdomen soft nontender, nondistended, gross motor function sensation intact, slight bilateral lower extremity edema    DIAGNOSTIC STUDIES / PROCEDURES  EKG  I have independently interpreted this EKG  Results for orders placed or performed during the hospital encounter of 10/11/23   EKG   Result Value Ref Range    Report       [facility] Emergency Dept.    Test Date:  2023-10-11  Pt Name:    CLEVELAND CARIAS                   Department: ER  MRN:        [id number]                      Room:       [facility]  Gender:     Female                       Technician: [id number]  :        [id number]                   Requested By:ER TRIAGE PROTOCOL  Order #:    [id number]                    Reading MD: Isaiah Willis    Measurements  Intervals                                Axis  Rate:       89                           P:          -2  NV:         196                          QRS:        -43  QRSD:       100                          T:          91  QT:         373  QTc:        454    Interpretive Statements  Interpreted by me: Sinus rhythm, rate 89, mild prolongation of QRS, normal  QTc, no signs of acute ischemia when compared to prior  Electronically Signed On 10- 19:03:42 PDT by Isaiah Willis           LABS  Labs Reviewed   CBC WITH DIFFERENTIAL - Abnormal; Notable for the following components:       Result Value    WBC 16.5 (*)     RBC 3.87 (*)     Hemoglobin 11.4 (*)     Hematocrit 34.7 (*)     Lymphocytes 21.00 (*)     Neutrophils (Absolute) 10.81 (*)     Monos " (Absolute) 1.72 (*)     All other components within normal limits    Narrative:     Biotin intake of greater than 5 mg per day may interfere with  troponin levels, causing false low values.   COMP METABOLIC PANEL - Abnormal; Notable for the following components:    Sodium 133 (*)     Potassium 3.4 (*)     Glucose 123 (*)     Bun 40 (*)     Creatinine 1.80 (*)     Correct Calcium 10.6 (*)     Globulin 3.7 (*)     All other components within normal limits    Narrative:     Biotin intake of greater than 5 mg per day may interfere with  troponin levels, causing false low values.   D-DIMER - Abnormal; Notable for the following components:    D-Dimer 3.96 (*)     All other components within normal limits   ESTIMATED GFR - Abnormal; Notable for the following components:    GFR (CKD-EPI) 26 (*)     All other components within normal limits    Narrative:     Biotin intake of greater than 5 mg per day may interfere with  troponin levels, causing false low values.   TROPONIN    Narrative:     Biotin intake of greater than 5 mg per day may interfere with  troponin levels, causing false low values.   TROPONIN    Narrative:     Biotin intake of greater than 5 mg per day may interfere with  troponin levels, causing false low values.   LIPASE    Narrative:     Biotin intake of greater than 5 mg per day may interfere with  troponin levels, causing false low values.   MAGNESIUM    Narrative:     Biotin intake of greater than 5 mg per day may interfere with  troponin levels, causing false low values.   APTT   PROTHROMBIN TIME   HEPARIN XA (UNFRACTIONATED)   PROBRAIN NATRIURETIC PEPTIDE, NT    Narrative:     Biotin intake of greater than 5 mg per day may interfere with  troponin levels, causing false low values.         RADIOLOGY  I have independently interpreted the diagnostic imaging associated with this visit and am waiting the final reading from the radiologist.   My preliminary interpretation is as follows: No  pneumothorax  Radiologist interpretation:   CT-ABDOMEN-PELVIS WITH   Final Result      1.  No evidence of acute inflammatory process in the abdomen or pelvis   2.  1 cm RIGHT upper pole renal mass suspicious for renal cell carcinoma of this could be proteinaceous or hemorrhagic cyst. This could be further evaluated with ultrasound when clinically appropriate.   3.  9 mm LEFT adrenal nodule unchanged since at least March 2005 and very likely a benign adenoma   4.  Atherosclerosis   5.  Colonic diverticulosis      CT-CTA CHEST PULMONARY ARTERY W/ RECONS   Final Result      1.  BILATERAL central pulmonary emboli   2.  Mildly elevated RV/LV ratio could indicate RIGHT heart strain   3.  Atherosclerosis   Findings were communicated with and acknowledged by ANAIS GURROLA via Voalte Me on 10/12/2023 12:16 AM.            DX-CHEST-PORTABLE (1 VIEW)   Final Result      Mild interstitial prominence could be chronic changes and/or mild vascular congestion.      Hazy left basilar opacity, atelectasis and/or edema. Cannot exclude trace left pleural effusion.            COURSE & MEDICAL DECISION MAKING    ED Observation Status?     INITIAL ASSESSMENT, COURSE AND PLAN  Care Narrative: Differential includes ACS, PE, pneumonia, dissection.  Patient developed mild hypoxemia and drop in blood pressure after fentanyl.  Began age patient without respecters for PE will obtain D-dimer to risk stratify.  ECG without signs of acute ischemia will obtain troponin as well as CMP and lipase.  Patient with benign abdominal exam lower suspicion for viscus perforation, pancreatitis or biliary disease.    Patient did have transient hypotension he is requiring 2 L nasal cannula as needed multiple doses of analgesics.  CT imaging of the abdomen pelvis without acute abnormality.  D-dimer was elevated so CTA was obtained which shows bilateral PE with signs of right heart strain.  Troponin is negative, BNP pending.  Spoke with intensivist who came  to bedside to assess patient and reviewed imaging recommends admission to medicine with telemetry, risks outweigh benefits for thrombolytics prior intervention given patient's age.  Spoke with hospitalist regarding admission for further management of PE.    CRITICAL CARE  The very real possibilty of a deterioration of this patient's condition required the highest level of my preparedness for sudden, emergent intervention.  I provided critical care services, which included medication orders, frequent reevaluations of the patient's condition and response to treatment, ordering and reviewing test results, and discussing the case with various consultants.  The critical care time associated with the care of the patient was 36 minutes. Review chart for interventions. This time is exclusive of any other billable procedures.         ADDITIONAL PROBLEM LIST    DISPOSITION AND DISCUSSIONS  I have discussed management of the patient with the following physicians and ESTHER's: Intensivist, hospitalist      FINAL DIAGNOSIS  1. Acute pulmonary embolism, unspecified pulmonary embolism type, unspecified whether acute cor pulmonale present (HCC)           Electronically signed by: Isaiah Willis D.O., 10/11/2023 7:02 PM

## 2023-10-12 NOTE — HOSPITAL COURSE
89-year-old female with history of breast cancer, dyslipidemia, hypertension, lymphedema and history of dysphagia presented 10/11 with chest pain and shortness of breath.  Patient started having shortness of breath and middle of the night, with pleuritic chest pain, denied any fever or chills and no other symptoms.  On admission patient was found to have hypoxia and needed 2 L nasal cannula, CTA showed bilateral PE.  Heparin drip was initiated.  Patient denied any history of traveling or surgery and no history of clots before      During his hospitalization, accidental finding of renal mass was also noted on CT imaging.  This was discussed with patient and she will follow-up with her PCP for management.

## 2023-10-12 NOTE — ED NOTES
Bedside report received from off going RN/tech: Colleen, assumed care of patient.  POC discussed with patient. Call light within reach, all needs addressed at this time.       Fall risk interventions in place: Move the patient closer to the nurse's station, Patient's personal possessions are with in their safe reach, Place socks on patient, Keep floor surfaces clean and dry, and Accompanied to restroom (all applicable per [facility] Fall risk assessment)   Continuous monitoring: Cardiac Leads, Pulse Ox, or Blood Pressure  IVF/IV medications: Infusion per MAR (List Med(s)) Heparin  Oxygen: How many liters 2L  Bedside sitter: Not Applicable   Isolation: Not Applicable

## 2023-10-12 NOTE — ED NOTES
Bedside report given to RN Colleen    Oxygen:2L NC  Fall Risk status:sign on door, bed in lowest position/locked, call light within reach  Patient Mentation:A+O x 4  Continuous cardiac monitoring:NSR  Heparin drip verified  with RN

## 2023-10-12 NOTE — ED NOTES
Pt sleeping on gurney, active chest rise noted, connected to monitor, call light within reach, NAD, VS stable

## 2023-10-12 NOTE — PROGRESS NOTES
4 Eyes Skin Assessment Completed by ANTONIO Sanchez and ANTONIO Jin.    Head WDL  Ears WDL  Nose WDL  Mouth WDL  Neck WDL  Breast/Chest WDL  Shoulder Blades WDL  Spine Redness  (R) Arm/Elbow/Hand WDL  (L) Arm/Elbow/Hand WDL  Abdomen WDL  Groin refused    Scrotum/Coccyx/Buttocks WDL  (R) Leg WDL  (L) Leg WDL  (R) Heel/Foot/Toe WDL  (L) Heel/Foot/Toe WDL          Devices In Places Tele Box, Blood Pressure Cuff, and Nasal Cannula      Interventions In Place Gray Ear Foams    Possible Skin Injury No    Pictures Uploaded Into Epic N/A  Wound Consult Placed N/A  RN Wound Prevention Protocol Ordered yes

## 2023-10-12 NOTE — ED TRIAGE NOTES
"Chief Complaint   Patient presents with    Chest Pain     Starting this AM, intermittent then constant, described as sharp, non-radiating, worse when pressing on chest, denies cardiac hx, pt received 1 nitro tab en route without relief     Pt BIB EMS for above complaints, VSS on 3L NC s/p fentanyl from EMS, GCS 15, NAD.    Pt received 50mcg fentanyl, 1 nitro tab from EMS and took 324 ASA at home.     BP 97/56   Pulse 93   Temp 36.6 °C (97.8 °F) (Temporal)   Resp 16   Ht 1.626 m (5' 4\")   Wt 61.2 kg (135 lb)   SpO2 96%   BMI 23.17 kg/m²     "

## 2023-10-12 NOTE — PROGRESS NOTES
[facility] Medicine Daily Progress Note    Date of Service  10/12/2023    Chief Complaint  Carie Kulkarni is a 89 y.o. female admitted 10/11/2023 with chest pain and SOB    Hospital Course  MsNacho Kulkarni is a 89 y.o. female with a PMHx of breast cancer, HLD, hypertension, lymphedema, nocturnal hypoxia, palpitations, peripheral edema, esophageal constriction causing dysphagia, who presented 10/11/2023 with chest pain.      Patient comes in complaining of chest pain has been going on intermittently throughout the day, nonradiating and sharp in nature.  Denies shortness of breath, fever/chills or other acute complaints.  CTA in the ED showed bilateral PE and she was started on heparin drip.  She says she has had a history of ulcers and had an EGD in the past, she said there was no bleeding at the time of the scope she had inflammation and stenosis in her esophagus and still has dysphagia.  Patient admitted to hospital medicine for management of care.    During his hospitalization, accidental finding of renal mass was also noted on CT imaging.  This was discussed with patient and she will follow-up with her PCP for management.  Discussed with patient being placed on anticoagulation currently on heparin drip and will be switched to DOAC.  Patient at this time needing oxygen and will need to be weaned off versus evaluation for home O2.    Interval Problem Update  -Patient seen and examined.  Patient reports mild shortness of breath, however, this has improved since admission.  Discussed with patient accidental finding of renal mass along with results from CT noting bilateral pulmonary embolism continue to wean off oxygen versus evaluation for home O2..   -Plan of care: Continue hep gtt. and transition to DOAC prior to admission; need to wean off O2 versus home O2 evaluation  -Disposition: Patient currently inpatient status as she is anticipated to stay 2-3 midnights for management of bilateral pulmonary embolism,  respiratory failure and need for oxygen  -Lab work: Reviewed; expected  -VSS at this time    I have discussed this patient's plan of care and discharge plan at IDT rounds today with Case Management, Nursing, Nursing leadership, and other members of the IDT team.    Consultants/Specialty  NONE    Code Status  Full Code    Disposition  The patient is not medically cleared for discharge to home or a post-acute facility.  Anticipate discharge to: home with close outpatient follow-up    I have placed the appropriate orders for post-discharge needs.    Review of Systems  Review of Systems   Constitutional:  Positive for malaise/fatigue. Negative for chills and fever.   HENT: Negative.     Eyes: Negative.    Respiratory:  Positive for shortness of breath.    Cardiovascular:  Positive for palpitations.   Gastrointestinal: Negative.    Genitourinary: Negative.    Musculoskeletal:  Positive for myalgias.   Skin: Negative.    Neurological:  Positive for weakness.   Endo/Heme/Allergies: Negative.    Psychiatric/Behavioral: Negative.          Physical Exam  Temp:  [36.6 °C (97.8 °F)-36.7 °C (98.1 °F)] 36.7 °C (98.1 °F)  Pulse:  [70-93] 78  Resp:  [16-22] 18  BP: ()/(50-71) 108/68  SpO2:  [91 %-97 %] 94 %    Physical Exam  Vitals and nursing note reviewed.   HENT:      Head: Normocephalic.      Nose: Nose normal.      Mouth/Throat:      Mouth: Mucous membranes are moist.      Pharynx: Oropharynx is clear.   Eyes:      Pupils: Pupils are equal, round, and reactive to light.   Cardiovascular:      Rate and Rhythm: Regular rhythm. Tachycardia present.      Pulses: Normal pulses.      Heart sounds: Normal heart sounds.   Pulmonary:      Effort: Pulmonary effort is normal.      Breath sounds: Normal breath sounds.   Chest:      Chest wall: Tenderness present.   Abdominal:      General: Bowel sounds are normal.      Palpations: Abdomen is soft.   Musculoskeletal:         General: Tenderness present.      Cervical back: Normal  range of motion and neck supple.   Skin:     General: Skin is dry.      Capillary Refill: Capillary refill takes 2 to 3 seconds.   Neurological:      Mental Status: She is alert. Mental status is at baseline.         Fluids    Intake/Output Summary (Last 24 hours) at 10/12/2023 1437  Last data filed at 10/12/2023 0233  Gross per 24 hour   Intake 1000 ml   Output --   Net 1000 ml       Laboratory  Recent Labs     10/11/23  1905   WBC 16.5*   RBC 3.87*   HEMOGLOBIN 11.4*   HEMATOCRIT 34.7*   MCV 89.7   MCH 29.5   MCHC 32.9   RDW 42.9   PLATELETCT 240   MPV 9.6     Recent Labs     10/11/23  1905   SODIUM 133*   POTASSIUM 3.4*   CHLORIDE 98   CO2 22   GLUCOSE 123*   BUN 40*   CREATININE 1.80*   CALCIUM 10.4     Recent Labs     10/12/23  0110   APTT 23.4*   INR 1.12               Imaging  US-RENAL   Final Result         1. Right upper pole renal lesion seen on recent CT is not visualized by ultrasound. Lesion could represent a proteinaceous cyst versus solid mass. Recommend MRI abdomen with/without contrast for further characterization.   2. Atrophic kidneys.   3. Otherwise, normal.      CT-ABDOMEN-PELVIS WITH   Final Result      1.  No evidence of acute inflammatory process in the abdomen or pelvis   2.  1 cm RIGHT upper pole renal mass suspicious for renal cell carcinoma of this could be proteinaceous or hemorrhagic cyst. This could be further evaluated with ultrasound when clinically appropriate.   3.  9 mm LEFT adrenal nodule unchanged since at least March 2005 and very likely a benign adenoma   4.  Atherosclerosis   5.  Colonic diverticulosis      CT-CTA CHEST PULMONARY ARTERY W/ RECONS   Final Result      1.  BILATERAL central pulmonary emboli   2.  Mildly elevated RV/LV ratio could indicate RIGHT heart strain   3.  Atherosclerosis   Findings were communicated with and acknowledged by ANAIS GURROLA via Voalte Me on 10/12/2023 12:16 AM.            DX-CHEST-PORTABLE (1 VIEW)   Final Result      Mild  interstitial prominence could be chronic changes and/or mild vascular congestion.      Hazy left basilar opacity, atelectasis and/or edema. Cannot exclude trace left pleural effusion.      EC-ECHOCARDIOGRAM COMPLETE W/O CONT    (Results Pending)        Assessment/Plan  * Bilateral pulmonary embolism (HCC)- (present on admission)  Assessment & Plan  Patient has bilateral central PE, came in complaining of intermittent chest pain for the past day  She was hypotensive on presentation to the ED with systolic 80s/90s but improved to the low 100s on my evaluation  Started on heparin drip, transition to DOAC in the morning  Monitor for signs of bleeding, she does that she has a history of ulcers and esophagitis  Patient says she has not been moving much lately and thinks decreased activity is playing a role.  She does have a mass incidentally found on CT scan in her right kidney, would be concerning for cancer due to presence of acute PE    Renal mass- (present on admission)  Assessment & Plan  1 cm right renal mass concerning for renal cell carcinoma versus hemorrhagic cyst versus other cause  Ultrasound ordered    Gastroesophageal reflux disease with esophagitis without hemorrhage- (present on admission)  Assessment & Plan  Says she had a scope in the past and was told she had inflammation and narrowing of her esophagus.  Takes over-the-counter antacid but does not recall the name  Will be on anticoagulation for PE, started on PPI    Pleuritic chest pain- (present on admission)  Assessment & Plan  Secondary to PE.  Troponin negative x2, EKG not concerning for acute ischemia  Will be monitored on telemetry    Acute kidney injury superimposed on chronic kidney disease (HCC)- (present on admission)  Assessment & Plan  Creatinine on presentation 1.8, previously was at baseline 0.86-1  Unclear etiology at this time, says she is in her normal state of health until chest pain began yesterday  BMP ordered to monitor kidney  function daily, avoid nephrotoxic agents, IV fluids         VTE prophylaxis:    enoxaparin ppx      I have performed a physical exam and reviewed and updated ROS and Plan today (10/12/2023). In review of yesterday's note (10/11/2023), there are no changes except as documented above.      Please note that this dictation was created using voice recognition software. I have made every reasonable attempt to correct obvious errors, but there may be errors of grammar and possibly content that I did not discover before finalizing the note.    Electronically signed by:  Dr. SOTO Ventura, DNP, APRN, FNP-C  Hospitalist Services  [facility]  [phone]  [email or web address]  10/12/23                 [phone]

## 2023-10-12 NOTE — ASSESSMENT & PLAN NOTE
1 cm right renal mass concerning for renal cell carcinoma versus hemorrhagic cyst versus other cause  Ultrasound was not able to evaluate the lesion  Patient will need MRI with and without contrast for evaluation, could be as outpatient.

## 2023-10-12 NOTE — CONSULTS
89yo F with history of HTN presenting with pulmonary embolism.  Currently vital signs are normal, she is on 2 L nasal cannula.      Given her age and otherwise normal vital signs there is in my opinion more risk to benefit to thrombolytic therapy as well as EKOS and aspiration thrombectomy.  Would recommend admitting to telemetry on heparin infusion.    Would avoid Lovenox for now given renal function but transition to NOAC as able.    Recommendations:  - admit telemetry  - Heparin with transition to NOAC  - ECHO  - LE DVT    Timi Roach M.D.

## 2023-10-12 NOTE — ASSESSMENT & PLAN NOTE
Creatinine on presentation 1.8, previously was at baseline 0.8  Unclear etiology at this time, says she is in her normal state of health until chest pain began yesterday  Improving 1.5  Avoid nephrotoxic medication  Bladder scan to rule out retention

## 2023-10-13 PROBLEM — E87.1 HYPONATREMIA: Status: ACTIVE | Noted: 2023-10-13

## 2023-10-13 PROBLEM — D64.9 ANEMIA: Status: ACTIVE | Noted: 2023-10-13

## 2023-10-13 LAB
ANION GAP SERPL CALC-SCNC: 12 MMOL/L (ref 7–16)
BUN SERPL-MCNC: 32 MG/DL (ref 8–22)
CALCIUM SERPL-MCNC: 9 MG/DL (ref 8.5–10.5)
CHLORIDE SERPL-SCNC: 95 MMOL/L (ref 96–112)
CO2 SERPL-SCNC: 20 MMOL/L (ref 20–33)
CREAT SERPL-MCNC: 1.5 MG/DL (ref 0.5–1.4)
ERYTHROCYTE [DISTWIDTH] IN BLOOD BY AUTOMATED COUNT: 41.5 FL (ref 35.9–50)
GFR SERPLBLD CREATININE-BSD FMLA CKD-EPI: 33 ML/MIN/1.73 M 2
GLUCOSE SERPL-MCNC: 87 MG/DL (ref 65–99)
HCT VFR BLD AUTO: 30.1 % (ref 37–47)
HCT VFR BLD AUTO: 30.8 % (ref 37–47)
HCT VFR BLD AUTO: 31.3 % (ref 37–47)
HGB BLD-MCNC: 10 G/DL (ref 12–16)
HGB BLD-MCNC: 10 G/DL (ref 12–16)
HGB BLD-MCNC: 10.3 G/DL (ref 12–16)
MCH RBC QN AUTO: 28.8 PG (ref 27–33)
MCHC RBC AUTO-ENTMCNC: 32.5 G/DL (ref 32.2–35.5)
MCV RBC AUTO: 88.8 FL (ref 81.4–97.8)
PLATELET # BLD AUTO: 222 K/UL (ref 164–446)
PMV BLD AUTO: 9.2 FL (ref 9–12.9)
POTASSIUM SERPL-SCNC: 3.8 MMOL/L (ref 3.6–5.5)
RBC # BLD AUTO: 3.47 M/UL (ref 4.2–5.4)
SODIUM SERPL-SCNC: 127 MMOL/L (ref 135–145)
UFH PPP CHRO-ACNC: 0.7 IU/ML
WBC # BLD AUTO: 11.2 K/UL (ref 4.8–10.8)

## 2023-10-13 PROCEDURE — 700105 HCHG RX REV CODE 258: Performed by: INTERNAL MEDICINE

## 2023-10-13 PROCEDURE — 700105 HCHG RX REV CODE 258: Performed by: STUDENT IN AN ORGANIZED HEALTH CARE EDUCATION/TRAINING PROGRAM

## 2023-10-13 PROCEDURE — 700111 HCHG RX REV CODE 636 W/ 250 OVERRIDE (IP): Mod: JZ | Performed by: STUDENT IN AN ORGANIZED HEALTH CARE EDUCATION/TRAINING PROGRAM

## 2023-10-13 PROCEDURE — 99233 SBSQ HOSP IP/OBS HIGH 50: CPT | Performed by: INTERNAL MEDICINE

## 2023-10-13 PROCEDURE — 85520 HEPARIN ASSAY: CPT

## 2023-10-13 PROCEDURE — 85014 HEMATOCRIT: CPT

## 2023-10-13 PROCEDURE — 700102 HCHG RX REV CODE 250 W/ 637 OVERRIDE(OP): Performed by: STUDENT IN AN ORGANIZED HEALTH CARE EDUCATION/TRAINING PROGRAM

## 2023-10-13 PROCEDURE — 85027 COMPLETE CBC AUTOMATED: CPT

## 2023-10-13 PROCEDURE — 700102 HCHG RX REV CODE 250 W/ 637 OVERRIDE(OP): Performed by: INTERNAL MEDICINE

## 2023-10-13 PROCEDURE — A9270 NON-COVERED ITEM OR SERVICE: HCPCS | Performed by: INTERNAL MEDICINE

## 2023-10-13 PROCEDURE — 36415 COLL VENOUS BLD VENIPUNCTURE: CPT

## 2023-10-13 PROCEDURE — 770020 HCHG ROOM/CARE - TELE (206)

## 2023-10-13 PROCEDURE — 85018 HEMOGLOBIN: CPT | Mod: 91

## 2023-10-13 PROCEDURE — A9270 NON-COVERED ITEM OR SERVICE: HCPCS | Performed by: STUDENT IN AN ORGANIZED HEALTH CARE EDUCATION/TRAINING PROGRAM

## 2023-10-13 PROCEDURE — 80048 BASIC METABOLIC PNL TOTAL CA: CPT

## 2023-10-13 RX ORDER — OMEPRAZOLE 20 MG/1
20 CAPSULE, DELAYED RELEASE ORAL 2 TIMES DAILY
Status: DISCONTINUED | OUTPATIENT
Start: 2023-10-13 | End: 2023-10-14 | Stop reason: HOSPADM

## 2023-10-13 RX ORDER — SODIUM CHLORIDE 9 MG/ML
500 INJECTION, SOLUTION INTRAVENOUS ONCE
Status: COMPLETED | OUTPATIENT
Start: 2023-10-13 | End: 2023-10-13

## 2023-10-13 RX ADMIN — OMEPRAZOLE 20 MG: 20 CAPSULE, DELAYED RELEASE ORAL at 18:19

## 2023-10-13 RX ADMIN — SODIUM CHLORIDE: 9 INJECTION, SOLUTION INTRAVENOUS at 20:00

## 2023-10-13 RX ADMIN — BENZONATATE 100 MG: 100 CAPSULE ORAL at 09:17

## 2023-10-13 RX ADMIN — DOCUSATE SODIUM 50 MG AND SENNOSIDES 8.6 MG 2 TABLET: 8.6; 5 TABLET, FILM COATED ORAL at 18:19

## 2023-10-13 RX ADMIN — SODIUM CHLORIDE 500 ML: 9 INJECTION, SOLUTION INTRAVENOUS at 14:15

## 2023-10-13 RX ADMIN — HEPARIN SODIUM 16 UNITS/KG/HR: 5000 INJECTION, SOLUTION INTRAVENOUS at 09:22

## 2023-10-13 ASSESSMENT — ENCOUNTER SYMPTOMS
SPEECH CHANGE: 0
CLAUDICATION: 0
COUGH: 0
PALPITATIONS: 0
HEMOPTYSIS: 0
DOUBLE VISION: 0
NAUSEA: 0
CONSTIPATION: 0
MYALGIAS: 0
WEAKNESS: 0
VOMITING: 0
SHORTNESS OF BREATH: 1
DIZZINESS: 0
DIARRHEA: 0
CHILLS: 0
WEIGHT LOSS: 0
BLURRED VISION: 0
NECK PAIN: 0
ABDOMINAL PAIN: 0
PHOTOPHOBIA: 0
ORTHOPNEA: 0
FEVER: 0

## 2023-10-13 ASSESSMENT — PAIN DESCRIPTION - PAIN TYPE: TYPE: ACUTE PAIN

## 2023-10-13 ASSESSMENT — FIBROSIS 4 INDEX: FIB4 SCORE: 1.99

## 2023-10-13 NOTE — PROGRESS NOTES
Bedside report received from ANTONIO Sanchez. Pt on 2 L O2 NC. Patient A&O x 4. Complains of chronic generalized pain 4/10. Pt declines pain medication at this time. POC discussed with patient. Patient verbalizes understanding. Call light and belongings within reach. Bed locked in lowest position, alarm and fall precautions in place.

## 2023-10-13 NOTE — CARE PLAN
The patient is Stable - Low risk of patient condition declining or worsening    Shift Goals  Clinical Goals: Heparin gtt will be therapeutic, O2 demands will not increase  Patient Goals: comfort, independence  Family Goals: KURT    Progress made toward(s) clinical / shift goals: Pt is maintaining O2 sats above 90% on 2 L O2 NC, pt is using their IS with a max of 800 ml, pt's heparin gtt XA has been therapeutic x2    Patient is not progressing towards the following goals:      Problem: Hemodynamics  Goal: Patient's hemodynamics, fluid balance and neurologic status will be stable or improve  Outcome: Progressing     Problem: Respiratory  Goal: Patient will achieve/maintain optimum respiratory ventilation and gas exchange  Outcome: Progressing

## 2023-10-13 NOTE — ASSESSMENT & PLAN NOTE
Hemoglobin dropped from 13-10, no signs of bleeding  Continue heparin drip at this time and close monitoring with hemoglobin every 8 hours  Check occult blood in stool and continue omeprazole

## 2023-10-13 NOTE — PROGRESS NOTES
Hospital Medicine Daily Progress Note    Date of Service  10/13/2023    Chief Complaint  Carie Kulkarni is a 89 y.o. female admitted 10/11/2023 with shortness of breath    Hospital Course  89-year-old female with history of breast cancer, dyslipidemia, hypertension, lymphedema and history of dysphagia presented 10/11 with chest pain and shortness of breath.  Patient started having shortness of breath and middle of the night, with pleuritic chest pain, denied any fever or chills and no other symptoms.  On admission patient was found to have hypoxia and needed 2 L nasal cannula, CTA showed bilateral PE.  Heparin drip was initiated.  Patient denied any history of traveling or surgery and no history of clots before      During his hospitalization, accidental finding of renal mass was also noted on CT imaging.  This was discussed with patient and she will follow-up with her PCP for management.          Interval Problem Update  -Evaluated examined the patient at bedside, patient is on 2 L nasal cannula, her blood pressure improved, hemoglobin is a stable, continue monitoring hemoglobin and transfer him.  To [facility] later.  -Worsening hyponatremia, fluid restriction 1500 mL per 24-hour  -Creatinine 1.5,, received 1 bolus of IV fluid    I have discussed this patient's plan of care and discharge plan at IDT rounds today with Case Management, Nursing, Nursing leadership, and other members of the IDT team.    Consultants/Specialty  None    Code Status  Full Code    Disposition  The patient is not medically cleared for discharge to home or a post-acute facility.  Anticipate discharge to: home with close outpatient follow-up    I have placed the appropriate orders for post-discharge needs.    Review of Systems  Review of Systems   Constitutional:  Negative for chills, fever and weight loss.   HENT:  Negative for ear pain, hearing loss and tinnitus.    Eyes:  Negative for blurred vision, double vision and photophobia.    Respiratory:  Positive for shortness of breath. Negative for cough and hemoptysis.    Cardiovascular:  Negative for chest pain, palpitations, orthopnea and claudication.   Gastrointestinal:  Negative for abdominal pain, constipation, diarrhea, nausea and vomiting.   Genitourinary:  Negative for dysuria, frequency and urgency.   Musculoskeletal:  Negative for myalgias and neck pain.   Skin:  Negative for rash.   Neurological:  Negative for dizziness, speech change and weakness.        Physical Exam  Temp:  [36.2 °C (97.2 °F)-36.9 °C (98.4 °F)] 36.2 °C (97.2 °F)  Pulse:  [77-93] 79  Resp:  [16-18] 16  BP: ()/(56-68) 122/57  SpO2:  [86 %-96 %] 96 %    Physical Exam  Constitutional:       General: She is not in acute distress.     Appearance: She is not ill-appearing.   HENT:      Head: Normocephalic and atraumatic.   Eyes:      General: No scleral icterus.  Cardiovascular:      Rate and Rhythm: Normal rate.      Heart sounds: No murmur heard.  Pulmonary:      Effort: No respiratory distress.      Breath sounds: No wheezing or rales.   Abdominal:      General: There is no distension.      Tenderness: There is no abdominal tenderness.   Skin:     Coloration: Skin is not jaundiced.      Findings: No erythema, lesion or rash.   Neurological:      General: No focal deficit present.      Mental Status: She is oriented to person, place, and time. Mental status is at baseline.      Cranial Nerves: No cranial nerve deficit.      Motor: No weakness.         Fluids    Intake/Output Summary (Last 24 hours) at 10/13/2023 1236  Last data filed at 10/13/2023 1100  Gross per 24 hour   Intake 360 ml   Output 150 ml   Net 210 ml       Laboratory  Recent Labs     10/11/23  1905 10/13/23  0422 10/13/23  1123   WBC 16.5* 11.2*  --    RBC 3.87* 3.47*  --    HEMOGLOBIN 11.4* 10.0* 10.0*   HEMATOCRIT 34.7* 30.8* 30.1*   MCV 89.7 88.8  --    MCH 29.5 28.8  --    MCHC 32.9 32.5  --    RDW 42.9 41.5  --    PLATELETCT 240 222  --    MPV  9.6 9.2  --      Recent Labs     10/11/23  1905 10/13/23  0422   SODIUM 133* 127*   POTASSIUM 3.4* 3.8   CHLORIDE 98 95*   CO2 22 20   GLUCOSE 123* 87   BUN 40* 32*   CREATININE 1.80* 1.50*   CALCIUM 10.4 9.0     Recent Labs     10/12/23  0110   APTT 23.4*   INR 1.12               Imaging  US-RENAL   Final Result         1. Right upper pole renal lesion seen on recent CT is not visualized by ultrasound. Lesion could represent a proteinaceous cyst versus solid mass. Recommend MRI abdomen with/without contrast for further characterization.   2. Atrophic kidneys.   3. Otherwise, normal.      CT-ABDOMEN-PELVIS WITH   Final Result      1.  No evidence of acute inflammatory process in the abdomen or pelvis   2.  1 cm RIGHT upper pole renal mass suspicious for renal cell carcinoma of this could be proteinaceous or hemorrhagic cyst. This could be further evaluated with ultrasound when clinically appropriate.   3.  9 mm LEFT adrenal nodule unchanged since at least March 2005 and very likely a benign adenoma   4.  Atherosclerosis   5.  Colonic diverticulosis      CT-CTA CHEST PULMONARY ARTERY W/ RECONS   Final Result      1.  BILATERAL central pulmonary emboli   2.  Mildly elevated RV/LV ratio could indicate RIGHT heart strain   3.  Atherosclerosis   Findings were communicated with and acknowledged by ANAIS GURROLA via [employer] on 10/12/2023 12:16 AM.            DX-CHEST-PORTABLE (1 VIEW)   Final Result      Mild interstitial prominence could be chronic changes and/or mild vascular congestion.      Hazy left basilar opacity, atelectasis and/or edema. Cannot exclude trace left pleural effusion.      EC-ECHOCARDIOGRAM COMPLETE W/O CONT    (Results Pending)        Assessment/Plan  * Bilateral pulmonary embolism (HCC)- (present on admission)  Assessment & Plan  Came with pleuritic chest pain and shortness of breath   Patient was found to have hypoxia   Patient is on 2 L nasal cannula   Blood pressure was low 90s on  admission however later improved   Continue heparin drip at this time, continue monitoring hemoglobin and switch to Eliquis later   waiting for echo    Hyponatremia  Assessment & Plan  Fluid restriction with monitoring labs daily    Anemia  Assessment & Plan  Hemoglobin dropped from 13-10, no signs of bleeding  Continue heparin drip at this time and close monitoring with hemoglobin every 8 hours  Check occult blood in stool and continue omeprazole    Renal mass- (present on admission)  Assessment & Plan  1 cm right renal mass concerning for renal cell carcinoma versus hemorrhagic cyst versus other cause  Ultrasound was not able to evaluate the lesion  Patient will need MRI with and without contrast for evaluation, could be as outpatient.    Gastroesophageal reflux disease with esophagitis without hemorrhage- (present on admission)  Assessment & Plan  Continue omeprazole and watching her hemoglobin    Pleuritic chest pain- (present on admission)  Assessment & Plan  Due to PE  Continue monitoring    PFO (patent foramen ovale)- (present on admission)  Assessment & Plan  Since patient has PE, patient needs to stay on anticoagulation    Acute kidney injury superimposed on chronic kidney disease (HCC)- (present on admission)  Assessment & Plan  Creatinine on presentation 1.8, previously was at baseline 0.8  Unclear etiology at this time, says she is in her normal state of health until chest pain began yesterday  Improving 1.5  Avoid nephrotoxic medication  Bladder scan to rule out retention         VTE prophylaxis:   SCDs/TEDs   therapeutic anticoagulation with weight-based heparin gtt, pharmacy to adjust PRN      I have performed a physical exam and reviewed and updated ROS and Plan today (10/13/2023). In review of yesterday's note (10/12/2023), there are no changes except as documented above.    Greater than 51 minutes spent prepping to see patient (e.g. review of tests) obtaining and/or reviewing separately obtained  history. Performing a medically appropriate examination and/ evaluation.  Counseling and educating the patient/family/caregiver.  Ordering medications, tests, or procedures.  Referring and communicating with other health care professionals.  Documenting clinical information in EPIC.  Independently interpreting results and communicating results to patient/family/caregiver.  Care coordination

## 2023-10-14 ENCOUNTER — APPOINTMENT (OUTPATIENT)
Dept: CARDIOLOGY | Facility: MEDICAL CENTER | Age: 88
DRG: 176 | End: 2023-10-14
Attending: INTERNAL MEDICINE
Payer: MEDICARE

## 2023-10-14 ENCOUNTER — PHARMACY VISIT (OUTPATIENT)
Dept: PHARMACY | Facility: MEDICAL CENTER | Age: 88
End: 2023-10-14
Payer: COMMERCIAL

## 2023-10-14 VITALS
WEIGHT: 137.35 LBS | TEMPERATURE: 97.8 F | BODY MASS INDEX: 23.45 KG/M2 | SYSTOLIC BLOOD PRESSURE: 132 MMHG | DIASTOLIC BLOOD PRESSURE: 72 MMHG | OXYGEN SATURATION: 94 % | RESPIRATION RATE: 19 BRPM | HEART RATE: 98 BPM | HEIGHT: 64 IN

## 2023-10-14 LAB
ALBUMIN SERPL BCP-MCNC: 2.9 G/DL (ref 3.2–4.9)
ALBUMIN/GLOB SERPL: 1 G/DL
ALP SERPL-CCNC: 61 U/L (ref 30–99)
ALT SERPL-CCNC: 7 U/L (ref 2–50)
ANION GAP SERPL CALC-SCNC: 9 MMOL/L (ref 7–16)
AST SERPL-CCNC: 22 U/L (ref 12–45)
BASOPHILS # BLD AUTO: 0.4 % (ref 0–1.8)
BASOPHILS # BLD: 0.04 K/UL (ref 0–0.12)
BILIRUB SERPL-MCNC: 0.5 MG/DL (ref 0.1–1.5)
BUN SERPL-MCNC: 28 MG/DL (ref 8–22)
CALCIUM ALBUM COR SERPL-MCNC: 9.2 MG/DL (ref 8.5–10.5)
CALCIUM SERPL-MCNC: 8.3 MG/DL (ref 8.5–10.5)
CHLORIDE SERPL-SCNC: 97 MMOL/L (ref 96–112)
CO2 SERPL-SCNC: 20 MMOL/L (ref 20–33)
CREAT SERPL-MCNC: 1.19 MG/DL (ref 0.5–1.4)
EOSINOPHIL # BLD AUTO: 0.52 K/UL (ref 0–0.51)
EOSINOPHIL NFR BLD: 4.7 % (ref 0–6.9)
ERYTHROCYTE [DISTWIDTH] IN BLOOD BY AUTOMATED COUNT: 40.2 FL (ref 35.9–50)
GFR SERPLBLD CREATININE-BSD FMLA CKD-EPI: 43 ML/MIN/1.73 M 2
GLOBULIN SER CALC-MCNC: 2.8 G/DL (ref 1.9–3.5)
GLUCOSE SERPL-MCNC: 91 MG/DL (ref 65–99)
HCT VFR BLD AUTO: 30.1 % (ref 37–47)
HGB BLD-MCNC: 9.9 G/DL (ref 12–16)
IMM GRANULOCYTES # BLD AUTO: 0.05 K/UL (ref 0–0.11)
IMM GRANULOCYTES NFR BLD AUTO: 0.5 % (ref 0–0.9)
LV EJECT FRACT MOD 2C 99903: 58.77
LV EJECT FRACT MOD 4C 99902: 47.71
LV EJECT FRACT MOD BP 99901: 55.22
LYMPHOCYTES # BLD AUTO: 1.81 K/UL (ref 1–4.8)
LYMPHOCYTES NFR BLD: 16.4 % (ref 22–41)
MAGNESIUM SERPL-MCNC: 1.5 MG/DL (ref 1.5–2.5)
MCH RBC QN AUTO: 28.7 PG (ref 27–33)
MCHC RBC AUTO-ENTMCNC: 32.9 G/DL (ref 32.2–35.5)
MCV RBC AUTO: 87.2 FL (ref 81.4–97.8)
MONOCYTES # BLD AUTO: 0.88 K/UL (ref 0–0.85)
MONOCYTES NFR BLD AUTO: 8 % (ref 0–13.4)
NEUTROPHILS # BLD AUTO: 7.76 K/UL (ref 1.82–7.42)
NEUTROPHILS NFR BLD: 70 % (ref 44–72)
NRBC # BLD AUTO: 0 K/UL
NRBC BLD-RTO: 0 /100 WBC (ref 0–0.2)
PLATELET # BLD AUTO: 247 K/UL (ref 164–446)
PMV BLD AUTO: 9.4 FL (ref 9–12.9)
POTASSIUM SERPL-SCNC: 4.2 MMOL/L (ref 3.6–5.5)
PROT SERPL-MCNC: 5.7 G/DL (ref 6–8.2)
RBC # BLD AUTO: 3.45 M/UL (ref 4.2–5.4)
SODIUM SERPL-SCNC: 126 MMOL/L (ref 135–145)
WBC # BLD AUTO: 11.1 K/UL (ref 4.8–10.8)

## 2023-10-14 PROCEDURE — A9270 NON-COVERED ITEM OR SERVICE: HCPCS | Performed by: INTERNAL MEDICINE

## 2023-10-14 PROCEDURE — 99239 HOSP IP/OBS DSCHRG MGMT >30: CPT | Performed by: INTERNAL MEDICINE

## 2023-10-14 PROCEDURE — 93306 TTE W/DOPPLER COMPLETE: CPT

## 2023-10-14 PROCEDURE — 80053 COMPREHEN METABOLIC PANEL: CPT

## 2023-10-14 PROCEDURE — 85025 COMPLETE CBC W/AUTO DIFF WBC: CPT

## 2023-10-14 PROCEDURE — A9270 NON-COVERED ITEM OR SERVICE: HCPCS | Performed by: STUDENT IN AN ORGANIZED HEALTH CARE EDUCATION/TRAINING PROGRAM

## 2023-10-14 PROCEDURE — 36415 COLL VENOUS BLD VENIPUNCTURE: CPT

## 2023-10-14 PROCEDURE — 700105 HCHG RX REV CODE 258: Performed by: STUDENT IN AN ORGANIZED HEALTH CARE EDUCATION/TRAINING PROGRAM

## 2023-10-14 PROCEDURE — RXMED WILLOW AMBULATORY MEDICATION CHARGE: Performed by: INTERNAL MEDICINE

## 2023-10-14 PROCEDURE — 700102 HCHG RX REV CODE 250 W/ 637 OVERRIDE(OP): Performed by: STUDENT IN AN ORGANIZED HEALTH CARE EDUCATION/TRAINING PROGRAM

## 2023-10-14 PROCEDURE — 700102 HCHG RX REV CODE 250 W/ 637 OVERRIDE(OP): Performed by: INTERNAL MEDICINE

## 2023-10-14 PROCEDURE — 83735 ASSAY OF MAGNESIUM: CPT

## 2023-10-14 PROCEDURE — 93306 TTE W/DOPPLER COMPLETE: CPT | Mod: 26 | Performed by: INTERNAL MEDICINE

## 2023-10-14 RX ORDER — POLYETHYLENE GLYCOL 3350 17 G/17G
1 POWDER, FOR SOLUTION ORAL DAILY
Status: DISCONTINUED | OUTPATIENT
Start: 2023-10-14 | End: 2023-10-14 | Stop reason: HOSPADM

## 2023-10-14 RX ORDER — OMEPRAZOLE 20 MG/1
20 CAPSULE, DELAYED RELEASE ORAL 2 TIMES DAILY
Qty: 30 CAPSULE | Refills: 1 | Status: SHIPPED | OUTPATIENT
Start: 2023-10-14 | End: 2023-10-19 | Stop reason: SDUPTHER

## 2023-10-14 RX ORDER — BISACODYL 10 MG
10 SUPPOSITORY, RECTAL RECTAL
Status: DISCONTINUED | OUTPATIENT
Start: 2023-10-14 | End: 2023-10-14 | Stop reason: HOSPADM

## 2023-10-14 RX ORDER — AMOXICILLIN 250 MG
2 CAPSULE ORAL 2 TIMES DAILY
Status: DISCONTINUED | OUTPATIENT
Start: 2023-10-14 | End: 2023-10-14 | Stop reason: HOSPADM

## 2023-10-14 RX ADMIN — DOCUSATE SODIUM 50 MG AND SENNOSIDES 8.6 MG 2 TABLET: 8.6; 5 TABLET, FILM COATED ORAL at 06:00

## 2023-10-14 RX ADMIN — APIXABAN 10 MG: 5 TABLET, FILM COATED ORAL at 08:26

## 2023-10-14 RX ADMIN — SODIUM CHLORIDE: 9 INJECTION, SOLUTION INTRAVENOUS at 08:32

## 2023-10-14 RX ADMIN — OMEPRAZOLE 20 MG: 20 CAPSULE, DELAYED RELEASE ORAL at 06:00

## 2023-10-14 ASSESSMENT — ENCOUNTER SYMPTOMS
CONSTIPATION: 0
COUGH: 0
PALPITATIONS: 0
BLURRED VISION: 0
HEMOPTYSIS: 0
PHOTOPHOBIA: 0
SPEECH CHANGE: 0
NECK PAIN: 0
VOMITING: 0
CLAUDICATION: 0
NAUSEA: 0
WEIGHT LOSS: 0
DIZZINESS: 0
ABDOMINAL PAIN: 0
MYALGIAS: 0
DOUBLE VISION: 0
SHORTNESS OF BREATH: 1
ORTHOPNEA: 0
CHILLS: 0
FEVER: 0
WEAKNESS: 0
DIARRHEA: 0

## 2023-10-14 ASSESSMENT — PAIN DESCRIPTION - PAIN TYPE: TYPE: ACUTE PAIN

## 2023-10-14 ASSESSMENT — FIBROSIS 4 INDEX: FIB4 SCORE: 3.37

## 2023-10-14 NOTE — DISCHARGE PLANNING
Case Management Discharge Planning    Admission Date: 10/11/2023  GMLOS: 2.5  ALOS: 2    6-Clicks ADL Score: 21  6-Clicks Mobility Score: 22      Anticipated Discharge Dispo:      DME Needed: Yes    DME Ordered: Yes    Action(s) Taken: Updated Provider/Nurse on Discharge Plan, Choice obtained, and Referral(s) sent    RN CM met with Pt at bedside to obtain choice for Oxygen DME. Pt choose [facility]. Choice fax to [facility].    Escalations Completed: None    Medically Clear: No    Next Steps: CM will continue to assist with discharge plan    Barriers to Discharge: Medical clearance    Is the patient up for discharge tomorrow: No

## 2023-10-14 NOTE — DISCHARGE PLANNING
Received choice form at: 1149  Agency/Facility name: [facility]  Referral sent per choice form at:  2672

## 2023-10-14 NOTE — PROGRESS NOTES
Hospital Medicine Daily Progress Note    Date of Service  10/14/2023    Chief Complaint  Carie Kulkarni is a 89 y.o. female admitted 10/11/2023 with shortness of breath    Hospital Course:    89-year-old female with history of breast cancer, dyslipidemia, hypertension, lymphedema and history of dysphagia presented 10/11 with chest pain and shortness of breath.  Patient started having shortness of breath and middle of the night, with pleuritic chest pain, denied any fever or chills and no other symptoms.  On admission patient was found to have hypoxia and needed 2 L nasal cannula, CTA showed bilateral PE.  Heparin drip was initiated.  Patient denied any history of traveling or surgery and no history of clots before, later hepar was switched to Eliquis with tolerating well.      During his hospitalization, accidental finding of renal mass was also noted on CT imaging.  This was discussed with patient and she will follow-up with her PCP for management.        Interval Problem Update  -Evaluated examined the patient at bedside, patient is on 2 L nasal cannula, her blood pressure improved, hemoglobin is a stable  -Continue Eliquis, hemoglobin stable  -Home oxygen eval showed patient needs oxygen 1 L, was ordered.  -Waiting for PT and OT evaluation before discharge and home oxygen.  -Improving her kidney function, waiting for echo before discharge    I have discussed this patient's plan of care and discharge plan at IDT rounds today with Case Management, Nursing, Nursing leadership, and other members of the IDT team.    Consultants/Specialty  None    Code Status  Full Code    Disposition  The patient is not medically cleared for discharge to home or a post-acute facility.  Anticipate discharge to: home with organized home healthcare and close outpatient follow-up  With him to get echo, home oxygen and PT OT evaluation before discharge  I have placed the appropriate orders for post-discharge needs.    Review of  Systems  Review of Systems   Constitutional:  Negative for chills, fever and weight loss.   HENT:  Negative for ear pain, hearing loss and tinnitus.    Eyes:  Negative for blurred vision, double vision and photophobia.   Respiratory:  Positive for shortness of breath. Negative for cough and hemoptysis.    Cardiovascular:  Negative for chest pain, palpitations, orthopnea and claudication.   Gastrointestinal:  Negative for abdominal pain, constipation, diarrhea, nausea and vomiting.   Genitourinary:  Negative for dysuria, frequency and urgency.   Musculoskeletal:  Negative for myalgias and neck pain.   Skin:  Negative for rash.   Neurological:  Negative for dizziness, speech change and weakness.        Physical Exam  Temp:  [36.5 °C (97.7 °F)-37.2 °C (99 °F)] 36.6 °C (97.8 °F)  Pulse:  [80-98] 98  Resp:  [16-20] 19  BP: (103-142)/(59-73) 132/72  SpO2:  [93 %-98 %] 94 %    Physical Exam  Constitutional:       General: She is not in acute distress.     Appearance: She is not ill-appearing.   HENT:      Head: Normocephalic and atraumatic.   Eyes:      General: No scleral icterus.  Cardiovascular:      Rate and Rhythm: Normal rate.      Heart sounds: No murmur heard.  Pulmonary:      Effort: No respiratory distress.      Breath sounds: No wheezing or rales.   Abdominal:      General: There is no distension.      Tenderness: There is no abdominal tenderness.   Skin:     Coloration: Skin is not jaundiced.      Findings: No erythema, lesion or rash.   Neurological:      General: No focal deficit present.      Mental Status: She is oriented to person, place, and time. Mental status is at baseline.      Cranial Nerves: No cranial nerve deficit.      Motor: No weakness.         Fluids    Intake/Output Summary (Last 24 hours) at 10/14/2023 1354  Last data filed at 10/13/2023 2000  Gross per 24 hour   Intake 240 ml   Output --   Net 240 ml       Laboratory  Recent Labs     10/11/23  1905 10/13/23  0422 10/13/23  1123  10/13/23  1652 10/14/23  0838   WBC 16.5* 11.2*  --   --  11.1*   RBC 3.87* 3.47*  --   --  3.45*   HEMOGLOBIN 11.4* 10.0* 10.0* 10.3* 9.9*   HEMATOCRIT 34.7* 30.8* 30.1* 31.3* 30.1*   MCV 89.7 88.8  --   --  87.2   MCH 29.5 28.8  --   --  28.7   MCHC 32.9 32.5  --   --  32.9   RDW 42.9 41.5  --   --  40.2   PLATELETCT 240 222  --   --  247   MPV 9.6 9.2  --   --  9.4     Recent Labs     10/11/23  1905 10/13/23  0422 10/14/23  0200   SODIUM 133* 127* 126*   POTASSIUM 3.4* 3.8 4.2   CHLORIDE 98 95* 97   CO2 22 20 20   GLUCOSE 123* 87 91   BUN 40* 32* 28*   CREATININE 1.80* 1.50* 1.19   CALCIUM 10.4 9.0 8.3*     Recent Labs     10/12/23  0110   APTT 23.4*   INR 1.12               Imaging  US-RENAL   Final Result         1. Right upper pole renal lesion seen on recent CT is not visualized by ultrasound. Lesion could represent a proteinaceous cyst versus solid mass. Recommend MRI abdomen with/without contrast for further characterization.   2. Atrophic kidneys.   3. Otherwise, normal.      CT-ABDOMEN-PELVIS WITH   Final Result      1.  No evidence of acute inflammatory process in the abdomen or pelvis   2.  1 cm RIGHT upper pole renal mass suspicious for renal cell carcinoma of this could be proteinaceous or hemorrhagic cyst. This could be further evaluated with ultrasound when clinically appropriate.   3.  9 mm LEFT adrenal nodule unchanged since at least March 2005 and very likely a benign adenoma   4.  Atherosclerosis   5.  Colonic diverticulosis      CT-CTA CHEST PULMONARY ARTERY W/ RECONS   Final Result      1.  BILATERAL central pulmonary emboli   2.  Mildly elevated RV/LV ratio could indicate RIGHT heart strain   3.  Atherosclerosis   Findings were communicated with and acknowledged by ANAIS GURROLA via [employer] on 10/12/2023 12:16 AM.            DX-CHEST-PORTABLE (1 VIEW)   Final Result      Mild interstitial prominence could be chronic changes and/or mild vascular congestion.      Hazy left basilar  opacity, atelectasis and/or edema. Cannot exclude trace left pleural effusion.      EC-ECHOCARDIOGRAM COMPLETE W/O CONT    (Results Pending)        Assessment/Plan  * Bilateral pulmonary embolism (HCC)- (present on admission)  Assessment & Plan  Likely unprovoked PE   came with pleuritic chest pain and shortness of breath   Patient was found to have hypoxia   Patient is on 2 L nasal cannula, patient needs home oxygen on discharge  Blood pressure was low 90s on admission however later improved   Initially heparin drip started and later switched to Eliquis, tolerating  Echo is pending    Hyponatremia  Assessment & Plan  Fluid restriction with monitoring labs daily    Anemia  Assessment & Plan  Hemoglobin dropped from 13-10, no signs of bleeding  Continue heparin drip at this time and close monitoring with hemoglobin every 8 hours  Check occult blood in stool and continue omeprazole    Renal mass- (present on admission)  Assessment & Plan  1 cm right renal mass concerning for renal cell carcinoma versus hemorrhagic cyst versus other cause  Ultrasound was not able to evaluate the lesion  Patient will need MRI with and without contrast for evaluation, could be as outpatient.    Gastroesophageal reflux disease with esophagitis without hemorrhage- (present on admission)  Assessment & Plan  Continue omeprazole and watching her hemoglobin    Pleuritic chest pain- (present on admission)  Assessment & Plan  Due to PE  Continue monitoring    PFO (patent foramen ovale)- (present on admission)  Assessment & Plan  Since patient has PE, patient needs to stay on anticoagulation    Acute kidney injury superimposed on chronic kidney disease (HCC)- (present on admission)  Assessment & Plan  Creatinine on presentation 1.8, previously was at baseline 0.8  Unclear etiology at this time, says she is in her normal state of health until chest pain began yesterday  Improving 1.5  Avoid nephrotoxic medication  Bladder scan to rule out  retention         VTE prophylaxis:   SCDs/TEDs   therapeutic anticoagulation with eliquis 5 mg BID      I have performed a physical exam and reviewed and updated ROS and Plan today (10/14/2023). In review of yesterday's note (10/13/2023), there are no changes except as documented above.    Greater than 51 minutes spent prepping to see patient (e.g. review of tests) obtaining and/or reviewing separately obtained history. Performing a medically appropriate examination and/ evaluation.  Counseling and educating the patient/family/caregiver.  Ordering medications, tests, or procedures.  Referring and communicating with other health care professionals.  Documenting clinical information in EPIC.  Independently interpreting results and communicating results to patient/family/caregiver.  Care coordination

## 2023-10-14 NOTE — FACE TO FACE
"Face to Face Note  -  Durable Medical Equipment    Jazmin Albarran M.D. - NPI: [id number]  I certify that this patient is under my care and that they had a durable medical equipment(DME)face to face encounter by myself that meets the physician DME face-to-face encounter requirements with this patient on:    Date of encounter:   Patient:                    MRN:                       YOB: 2023  Carie Kulkarni  [id number]  5/4/1933     The encounter with the patient was in whole, or in part, for the following medical condition, which is the primary reason for durable medical equipment:  Other - PE    I certify that, based on my findings, the following durable medical equipment is medically necessary:    Oxygen   HOME O2 Saturation Measurements:(Values must be present for Home Oxygen orders)  Room air sat at rest: 90  Room air sat with amb: 88  With liters of O2: 1, O2 sat at rest with O2: 94  With Liters of O2: 1, O2 sat with amb with O2 : 90  Is the patient mobile?: Yes  If patient feels more short of breath, they can go up to 6 liters per minute and contact healthcare provider.    Supporting Symptoms: The patient requires supplemental oxygen, as the following interventions have been tried with limited or no improvement: \"Bronchodilators and/or steroid inhalers and \"Positive expiratory pressure therapies.    My Clinical findings support the need for the above equipment due to:  Hypoxia  "

## 2023-10-14 NOTE — DISCHARGE INSTRUCTIONS
Special Equipment    You are being discharged with the following special equipment:  Oxygen    Discharge Instructions    Discharged to home by taxi with self. Discharged via wheelchair, hospital escort: Yes.  Special equipment needed: Oxygen    Be sure to schedule a follow-up appointment with your primary care doctor or any specialists as instructed.     Discharge Plan:   Diet Plan: Discussed  Activity Level: Discussed  Confirmed Follow up Appointment: Patient to Call and Schedule Appointment  Confirmed Symptoms Management: Discussed  Medication Reconciliation Updated: Yes  Influenza Vaccine Indication: Patient Refuses    I understand that a diet low in cholesterol, fat, and sodium is recommended for good health. Unless I have been given specific instructions below for another diet, I accept this instruction as my diet prescription.   Other diet: N/A    Special Instructions:     -Is this patient being discharged with medication to prevent blood clots?  No - you are being discharged on a medication to treat blood clots (Eliquis).    Is patient discharged on Warfarin / Coumadin?   No

## 2023-10-14 NOTE — DISCHARGE SUMMARY
Discharge Summary    CHIEF COMPLAINT ON ADMISSION  Chief Complaint   Patient presents with    Chest Pain     Starting this AM, intermittent then constant, described as sharp, non-radiating, worse when pressing on chest, denies cardiac hx, pt received 1 nitro tab en route without relief       Reason for Admission  Unprovoked PE    Admission Date  10/11/2023    CODE STATUS  Full Code    HPI & HOSPITAL COURSE    89-year-old female with history of breast cancer, dyslipidemia, hypertension, lymphedema and history of dysphagia presented 10/11 with chest pain and shortness of breath.  Patient started having shortness of breath and middle of the night, with pleuritic chest pain, denied any fever or chills and no other symptoms.  On admission patient was found to have hypoxia and needed 2 L nasal cannula, CTA showed bilateral PE.  Heparin drip was initiated.  Patient denied any history of traveling or surgery and no history of clots before, later heparin was switched to Eliquis with tolerating well, her hemoglobin has been stable and patient feels okay for discharge, home oxygen eval showed patient needs 1 L nasal cannula 24/7, patient will be discharged on Eliquis.  Echo was normal.  Long discussion was done with the patient about anticoagulation, discussed the risk/benefit, discussed the risk of bleeding also discussed the risk of recurrent clots, patient understood and agreed to continue Eliquis.    Patient lives with her son, patient was able to move around with no assistance, patient feels okay for discharge to follow-up with her PCP.     During his hospitalization, accidental finding of renal mass was also noted on CT imaging.  This was discussed with patient and she will follow-up with her PCP for management.  Patient is to follow-up with the PCP to get MRI with and without contrast.    Therefore, she is discharged in good and stable condition to home with close outpatient follow-up.    The patient met 2-midnight  criteria for an inpatient stay at the time of discharge.    Discharge Date  10/14/23      FOLLOW UP ITEMS POST DISCHARGE  Follow-up with PCP for the renal mass with ultrasound and MRI  Follow-up with PCP for hypoxia and PE and repeat CBC      DISCHARGE DIAGNOSES  Principal Problem:    Bilateral pulmonary embolism (HCC) (POA: Yes)  Active Problems:    Acute kidney injury superimposed on chronic kidney disease (HCC) (POA: Yes)    PFO (patent foramen ovale) (POA: Yes)      Overview: PFO (patent foramen ovale) with left to right shunting, RVSP 36          Pleuritic chest pain (POA: Yes)    Gastroesophageal reflux disease with esophagitis without hemorrhage (POA: Yes)    Renal mass (POA: Yes)    Anemia (POA: Unknown)    Hyponatremia (POA: Unknown)  Resolved Problems:    * No resolved hospital problems. *      FOLLOW UP  Deedee Bell, A.P.R.N.  [address]  [place] [address]  [phone]    Follow up in 1 week(s)        MEDICATIONS ON DISCHARGE     Medication List        START taking these medications        Instructions   * Eliquis 5mg Tabs  Generic drug: apixaban   Take 2 Tablets by mouth 2 times a day for 6 days. Indications: DVT/PE  Dose: 10 mg     * Eliquis 5mg Tabs  Start taking on: October 21, 2023  Generic drug: apixaban   Take 1 Tablet by mouth 2 times a day. Indications: DVT/PE  Dose: 5 mg     omeprazole 20 MG delayed-release capsule  Commonly known as: PriLOSEC   Take 1 Capsule by mouth 2 times a day.  Dose: 20 mg           * This list has 2 medication(s) that are the same as other medications prescribed for you. Read the directions carefully, and ask your doctor or other care provider to review them with you.                CONTINUE taking these medications        Instructions   loratadine 10 MG Tabs  Commonly known as: Claritin   Take 10 mg by mouth 1 time a day as needed.  Dose: 10 mg     olmesartan 20 MG Tabs  Commonly known as: Benicar   Take 40 mg by mouth every day.  Dose: 40  mg     Sinus Relief Congestion-Pain 5-325-200 MG Tabs  Generic drug: Phenylephrine-APAP-guaiFENesin   Take 1 Tablet by mouth 1 time a day as needed.  Dose: 1 Tablet     vitamin D3 1000 Unit (25 mcg) Tabs  Commonly known as: Cholecalciferol   Take 1,000 Units by mouth every day.  Dose: 1,000 Units              Allergies  Allergies   Allergen Reactions    Pcn [Penicillins] Unspecified     Unknown reaction, 1993 per pt       DIET  Orders Placed This Encounter   Procedures    Diet Order Diet: Regular; Fluid modifications: (optional): 1500 ml Fluid Restriction     Standing Status:   Standing     Number of Occurrences:   1     Order Specific Question:   Diet:     Answer:   Regular [1]     Order Specific Question:   Fluid modifications: (optional)     Answer:   1500 ml Fluid Restriction [9]       ACTIVITY  As tolerated.  Weight bearing as tolerated    CONSULTATIONS  None    PROCEDURES  None    LABORATORY  Lab Results   Component Value Date    SODIUM 126 (L) 10/14/2023    POTASSIUM 4.2 10/14/2023    CHLORIDE 97 10/14/2023    CO2 20 10/14/2023    GLUCOSE 91 10/14/2023    BUN 28 (H) 10/14/2023    CREATININE 1.19 10/14/2023    CREATININE 1.3 10/18/2008        Lab Results   Component Value Date    WBC 11.1 (H) 10/14/2023    HEMOGLOBIN 9.9 (L) 10/14/2023    HEMATOCRIT 30.1 (L) 10/14/2023    PLATELETCT 247 10/14/2023        Total time of the discharge process exceeds 34 minutes.

## 2023-10-14 NOTE — PROGRESS NOTES
Pt given discharge instructions.  Discussed diet, activity, follow up appointments, symptoms and management, and prescriptions provided.  Packet sent with patient.  IV d/c'd, tele box off, and all questions answered.  Meds to beds delivered. Cab voucher given to patient, address confirmed.

## 2023-10-14 NOTE — PROGRESS NOTES
Bedside report received from ANTONIO Sanchez. Pt on 2 L O2 NC. Patient A&O x 4. Pt does not complain of pain at this time. POC discussed with patient. Patient verbalizes understanding. Call light and belongings within reach. Bed locked in lowest position, alarm and fall precautions in place.

## 2023-10-14 NOTE — PROGRESS NOTES
Patient's son Tacos aware of discharge plan and will be home and available to assist patient out of cab and into the home.

## 2023-10-14 NOTE — CARE PLAN
The patient is Stable - Low risk of patient condition declining or worsening    Shift Goals  Clinical Goals: Heparin gtt will remain therapeutic, pt will utilize their IS  Patient Goals: sleep, comfort  Family Goals: KURT    Progress made toward(s) clinical / shift goals:  Pt's Heparin XA has been therapeutic x2, pt's O2 needs have not increased this shift, pt using IS, VS WNL    Patient is not progressing towards the following goals:      Problem: Hemodynamics  Goal: Patient's hemodynamics, fluid balance and neurologic status will be stable or improve  Outcome: Progressing     Problem: Respiratory  Goal: Patient will achieve/maintain optimum respiratory ventilation and gas exchange  Outcome: Progressing

## 2023-10-16 ENCOUNTER — PATIENT OUTREACH (OUTPATIENT)
Dept: MEDICAL GROUP | Facility: PHYSICIAN GROUP | Age: 88
End: 2023-10-16
Payer: MEDICARE

## 2023-10-16 NOTE — PROGRESS NOTES
Transitional Care Management  TCM Outreach Date and Time: Filed (10/16/2023  1:33 PM)    Discharge Questions  Actual Discharge Date: 10/14/23  Now that you are home, how are you feeling?: Good  Did you receive any new prescriptions?: Yes (elequis, omeprazole)  Were you able to get them filled?: Yes  Meds to Bed or Pharmacy filled?: Meds to Bed  Do you have any questions about your current medications or new medications (Review Med Rec)?: No  Do you have a follow up appointment scheduled with your PCP?: Yes (Deedee Bell)  Appointment Date: 10/19/23  Appointment Time: 1300  Any issues or paperwork you wish to discuss with your PCP?: Yes (Please specify) (Needs medication for green nasal drainage and runny nose and sinus', would like amoxicillin)  Does this patient qualify for the CCM program?: No    Transitional Care  Number of attempts made to contact patient: 1  Current or previous attempts competed within two business days of discharge? : Yes  Provided education regarding treatment plan, medications, self-management, ADLs?: No  Has patient completed an Advanced Directive?: No  Has the Care Manager's phone number provided?: No  Is there anything else I can help you with?: No    Discharge Summary  Chief Complaint: chest pain  Admitting Diagnosis: unprovoked PE  Discharge Diagnosis: Bilateral PE's/ Renal Mass

## 2023-10-19 ENCOUNTER — OFFICE VISIT (OUTPATIENT)
Dept: MEDICAL GROUP | Facility: PHYSICIAN GROUP | Age: 88
End: 2023-10-19
Payer: MEDICARE

## 2023-10-19 VITALS
BODY MASS INDEX: 22.36 KG/M2 | WEIGHT: 131 LBS | OXYGEN SATURATION: 94 % | HEART RATE: 77 BPM | RESPIRATION RATE: 14 BRPM | HEIGHT: 64 IN | TEMPERATURE: 98.1 F | SYSTOLIC BLOOD PRESSURE: 120 MMHG | DIASTOLIC BLOOD PRESSURE: 72 MMHG

## 2023-10-19 DIAGNOSIS — Z09 HOSPITAL DISCHARGE FOLLOW-UP: Primary | ICD-10-CM

## 2023-10-19 DIAGNOSIS — I26.99 BILATERAL PULMONARY EMBOLISM (HCC): ICD-10-CM

## 2023-10-19 DIAGNOSIS — R07.81 PLEURITIC CHEST PAIN: ICD-10-CM

## 2023-10-19 DIAGNOSIS — Z78.0 ENCOUNTER FOR OSTEOPOROSIS SCREENING IN ASYMPTOMATIC POSTMENOPAUSAL PATIENT: ICD-10-CM

## 2023-10-19 DIAGNOSIS — Z13.820 ENCOUNTER FOR OSTEOPOROSIS SCREENING IN ASYMPTOMATIC POSTMENOPAUSAL PATIENT: ICD-10-CM

## 2023-10-19 DIAGNOSIS — I26.99 ACUTE PULMONARY EMBOLISM, UNSPECIFIED PULMONARY EMBOLISM TYPE, UNSPECIFIED WHETHER ACUTE COR PULMONALE PRESENT (HCC): ICD-10-CM

## 2023-10-19 DIAGNOSIS — J01.40 ACUTE NON-RECURRENT PANSINUSITIS: ICD-10-CM

## 2023-10-19 PROCEDURE — 99496 TRANSJ CARE MGMT HIGH F2F 7D: CPT | Performed by: NURSE PRACTITIONER

## 2023-10-19 PROCEDURE — 3078F DIAST BP <80 MM HG: CPT | Performed by: NURSE PRACTITIONER

## 2023-10-19 PROCEDURE — 3074F SYST BP LT 130 MM HG: CPT | Performed by: NURSE PRACTITIONER

## 2023-10-19 RX ORDER — OMEPRAZOLE 20 MG/1
20 CAPSULE, DELAYED RELEASE ORAL 2 TIMES DAILY
Qty: 180 CAPSULE | Refills: 1 | Status: SHIPPED | OUTPATIENT
Start: 2023-10-19 | End: 2023-12-04

## 2023-10-19 RX ORDER — AMOXICILLIN AND CLAVULANATE POTASSIUM 875; 125 MG/1; MG/1
1 TABLET, FILM COATED ORAL 2 TIMES DAILY
Qty: 20 TABLET | Refills: 0 | Status: SHIPPED | OUTPATIENT
Start: 2023-10-19 | End: 2023-10-29

## 2023-10-19 ASSESSMENT — FIBROSIS 4 INDEX: FIB4 SCORE: 3.03

## 2023-10-19 NOTE — ASSESSMENT & PLAN NOTE
Patient was admitted to the hospital for chest pain. She was found to have bilateral PE and pleurisy.     She reports that the pain has improved.

## 2023-10-19 NOTE — PROGRESS NOTES
Subjective:     Carie Kulkarni is a 89 y.o. female who presents for Hospital Follow-up.    Transitional Care Management  TCM Outreach Date and Time: Filed (10/16/2023  1:33 PM)    Discharge Questions  Actual Discharge Date: 10/14/23  Now that you are home, how are you feeling?: Good  Did you receive any new prescriptions?: Yes (elequis, omeprazole)  Were you able to get them filled?: Yes  Meds to Bed or Pharmacy filled?: Meds to Bed  Do you have any questions about your current medications or new medications (Review Med Rec)?: No  Do you have a follow up appointment scheduled with your PCP?: Yes (Deedee Bell)  Appointment Date: 10/19/23  Appointment Time: 1300  Any issues or paperwork you wish to discuss with your PCP?: Yes (Please specify) (Needs medication for green nasal drainage and runny nose and sinus', would like amoxicillin)  Does this patient qualify for the CCM program?: No    Transitional Care  Number of attempts made to contact patient: 1  Current or previous attempts competed within two business days of discharge? : Yes  Provided education regarding treatment plan, medications, self-management, ADLs?: No  Has patient completed an Advanced Directive?: No  Has the Care Manager's phone number provided?: No  Is there anything else I can help you with?: No    Discharge Summary  Chief Complaint: chest pain  Admitting Diagnosis: unprovoked PE  Discharge Diagnosis: Bilateral PE's/ Renal Mass        HPI:   Recently hospitalized for bilateral PE    Current medicines (including reconciliation performed today)  Current Outpatient Medications   Medication Sig Dispense Refill    omeprazole (PRILOSEC) 20 MG delayed-release capsule Take 1 Capsule by mouth 2 times a day. 180 Capsule 1    amoxicillin-clavulanate (AUGMENTIN) 875-125 MG Tab Take 1 Tablet by mouth 2 times a day for 10 days. 20 Tablet 0    apixaban (ELIQUIS) 5mg Tab Take 2 Tablets by mouth 2 times a day for 6 days. Indications: DVT/PE 24 Tablet 0     "[START ON 10/21/2023] apixaban (ELIQUIS) 5mg Tab Take 1 Tablet by mouth 2 times a day. Indications: DVT/PE 90 Tablet 4    Phenylephrine-APAP-guaiFENesin (SINUS RELIEF CONGESTION-PAIN) 5-325-200 MG Tab Take 1 Tablet by mouth 1 time a day as needed.      vitamin D3 (CHOLECALCIFEROL) 1000 Unit (25 mcg) Tab Take 1,000 Units by mouth every day.      olmesartan (BENICAR) 20 MG Tab Take 40 mg by mouth every day.      loratadine (CLARITIN) 10 MG Tab Take 10 mg by mouth 1 time a day as needed.       No current facility-administered medications for this visit.       Allergies:   Pcn [penicillins]    Social History     Tobacco Use    Smoking status: Never    Smokeless tobacco: Never   Substance Use Topics    Alcohol use: No     Alcohol/week: 0.0 oz     Comment: none in decades    Drug use: No         Objective:     Vitals:    10/19/23 1250   BP: 120/72   BP Location: Left arm   Patient Position: Sitting   Pulse: 77   Resp: 14   Temp: 36.7 °C (98.1 °F)   TempSrc: Temporal   SpO2: 94%   Weight: 59.4 kg (131 lb)   Height: 1.626 m (5' 4\")     Body mass index is 22.49 kg/m².      Assessment and Plan:   1. Encounter for osteoporosis screening in asymptomatic postmenopausal patient  - DS-BONE DENSITY STUDY (DEXA); Future    2. Bilateral pulmonary embolism (HCC)    3. Pleuritic chest pain    4. Acute pulmonary embolism, unspecified pulmonary embolism type, unspecified whether acute cor pulmonale present (HCC)  - omeprazole (PRILOSEC) 20 MG delayed-release capsule; Take 1 Capsule by mouth 2 times a day.  Dispense: 180 Capsule; Refill: 1    Other orders  - amoxicillin-clavulanate (AUGMENTIN) 875-125 MG Tab; Take 1 Tablet by mouth 2 times a day for 10 days.  Dispense: 20 Tablet; Refill: 0      - Chart and discharge summary were reviewed.   - Hospitalization and results reviewed with patient.   - Medications reviewed including instructions regarding high risk medications, dosing and side effects.  - Recommended Services: No services " needed at this time  - Advance directive/POLST on file?  No     Follow-up:Return in about 3 months (around 1/19/2024) for follow up for PE.    Face-to-face transitional care management services with HIGH (today's visit is within days post discharge & LACE+ score 59+) medical decision complexity were provided.

## 2023-10-19 NOTE — ASSESSMENT & PLAN NOTE
New condition. Patient was recent in the hospital due to chest pain and SOB. She was found to have a PE. She was started on a heparin drip and sent home on oxygen.     She reports that the pain in her chest is feeling better. She is still unsing the oxygen at home.     Patient to continue eliquis 5mg BID. Patient to follow up in 3 months.

## 2023-11-03 ENCOUNTER — HOSPITAL ENCOUNTER (OUTPATIENT)
Dept: RADIOLOGY | Facility: MEDICAL CENTER | Age: 88
End: 2023-11-03
Attending: NURSE PRACTITIONER
Payer: MEDICARE

## 2023-11-03 DIAGNOSIS — Z13.820 ENCOUNTER FOR OSTEOPOROSIS SCREENING IN ASYMPTOMATIC POSTMENOPAUSAL PATIENT: ICD-10-CM

## 2023-11-03 DIAGNOSIS — Z78.0 ENCOUNTER FOR OSTEOPOROSIS SCREENING IN ASYMPTOMATIC POSTMENOPAUSAL PATIENT: ICD-10-CM

## 2023-11-03 PROCEDURE — 77080 DXA BONE DENSITY AXIAL: CPT

## 2023-11-14 ENCOUNTER — TELEPHONE (OUTPATIENT)
Dept: MEDICAL GROUP | Facility: PHYSICIAN GROUP | Age: 88
End: 2023-11-14
Payer: MEDICARE

## 2023-11-14 ENCOUNTER — TELEPHONE (OUTPATIENT)
Dept: MEDICAL GROUP | Facility: PHYSICIAN GROUP | Age: 88
End: 2023-11-14

## 2023-11-14 NOTE — TELEPHONE ENCOUNTER
1. Caller Name: Carie Kulkarni                        Call Back Number: [phone] (home)       How would the patient prefer to be contacted with a response: Phone call OK to leave a detailed message    Patient would like to know her results from her DEXA?    Please advise?

## 2023-11-21 ENCOUNTER — TELEPHONE (OUTPATIENT)
Dept: MEDICAL GROUP | Facility: PHYSICIAN GROUP | Age: 88
End: 2023-11-21
Payer: MEDICARE

## 2023-11-21 NOTE — TELEPHONE ENCOUNTER
Phone Number Called: [phone] (home)      Call outcome: Spoke to patient regarding message below.    Message:Called and spoke with patient regarding the matters of her DEXA results.  Patient had no other further questions/concerns.

## 2023-11-27 ENCOUNTER — TELEPHONE (OUTPATIENT)
Dept: MEDICAL GROUP | Facility: PHYSICIAN GROUP | Age: 88
End: 2023-11-27
Payer: MEDICARE

## 2023-11-27 NOTE — TELEPHONE ENCOUNTER
1. Caller Name: Carie Kulkarni                        Call Back Number: [phone] (home)       How would the patient prefer to be contacted with a response: Phone call OK to leave a detailed message    Patient has not used her oxygen for about 3 weeks so she called [facility] to return it but they need a discontinue order from you in order to go pick it up.  She got her oxygen order from the hospital.  She no longer needs it. She is checking her oxygen at home and it is reading in range.    Please advise?

## 2023-11-30 NOTE — TELEPHONE ENCOUNTER
Phone Number Called: [phone] (home)       Call outcome: Spoke to patient regarding message below.    Message: Pt called requesting a follow up on her Oxygen. Informed pt I would send a message to pcp.   Please Advise.

## 2023-12-01 NOTE — TELEPHONE ENCOUNTER
Phone Number Called: [phone] (home)       Call outcome: Spoke to patient regarding message below.    Message: informed pt of pcp message.

## 2023-12-04 ENCOUNTER — OFFICE VISIT (OUTPATIENT)
Dept: MEDICAL GROUP | Facility: PHYSICIAN GROUP | Age: 88
End: 2023-12-04
Payer: MEDICARE

## 2023-12-04 VITALS
WEIGHT: 129 LBS | TEMPERATURE: 98.5 F | SYSTOLIC BLOOD PRESSURE: 90 MMHG | HEIGHT: 64 IN | RESPIRATION RATE: 16 BRPM | OXYGEN SATURATION: 96 % | HEART RATE: 94 BPM | BODY MASS INDEX: 22.02 KG/M2 | DIASTOLIC BLOOD PRESSURE: 60 MMHG

## 2023-12-04 DIAGNOSIS — I26.99 ACUTE PULMONARY EMBOLISM, UNSPECIFIED PULMONARY EMBOLISM TYPE, UNSPECIFIED WHETHER ACUTE COR PULMONALE PRESENT (HCC): ICD-10-CM

## 2023-12-04 DIAGNOSIS — M81.0 AGE-RELATED OSTEOPOROSIS WITHOUT CURRENT PATHOLOGICAL FRACTURE: ICD-10-CM

## 2023-12-04 DIAGNOSIS — N18.31 CKD STAGE G3A/A1, GFR 45-59 AND ALBUMIN CREATININE RATIO <30 MG/G: ICD-10-CM

## 2023-12-04 DIAGNOSIS — I26.99 BILATERAL PULMONARY EMBOLISM (HCC): ICD-10-CM

## 2023-12-04 DIAGNOSIS — I10 PRIMARY HYPERTENSION: ICD-10-CM

## 2023-12-04 PROBLEM — K40.90 RIGHT INGUINAL HERNIA: Status: RESOLVED | Noted: 2021-10-27 | Resolved: 2023-12-04

## 2023-12-04 PROBLEM — R07.81 PLEURITIC CHEST PAIN: Status: RESOLVED | Noted: 2023-10-12 | Resolved: 2023-12-04

## 2023-12-04 PROBLEM — E87.1 HYPONATREMIA: Status: RESOLVED | Noted: 2023-10-13 | Resolved: 2023-12-04

## 2023-12-04 PROCEDURE — 99215 OFFICE O/P EST HI 40 MIN: CPT | Performed by: NURSE PRACTITIONER

## 2023-12-04 PROCEDURE — 3074F SYST BP LT 130 MM HG: CPT | Performed by: NURSE PRACTITIONER

## 2023-12-04 PROCEDURE — 3078F DIAST BP <80 MM HG: CPT | Performed by: NURSE PRACTITIONER

## 2023-12-04 ASSESSMENT — FIBROSIS 4 INDEX: FIB4 SCORE: 3.03

## 2023-12-04 NOTE — LETTER
December 4, 2023         Patient: Carie Kulkarni   YOB: 1933   Date of Visit: 12/4/2023           To Whom it May Concern:    Carie Kulkarni was seen in my clinic on 12/4/2023. She no longer requires her oxygen as she has recovered. Please discontinue patients oxygen and  supplies.     If you have any questions or concerns, please don't hesitate to call.        Sincerely,           TREY [email or web address].  Electronically Signed

## 2023-12-05 NOTE — ASSESSMENT & PLAN NOTE
Chronic stable.  Patient recently had acute kidney injury.  By the time she was discharged from the hospital her GFR did recover to 48 and her creatinine was back within normal limits.    Will order labs to monitor every 3 to 12 months.

## 2023-12-05 NOTE — ASSESSMENT & PLAN NOTE
Chronic and stable.  Patient recently had DEXA scan which did show osteo porosis in both her left forearm and left femur.    Discussed with patient methods to help increase bone density including weightbearing exercise, vitamin D supplementation and calcium supplementation.    Patient does take vitamin D at this time and she states that she drinks at least 1 glass of milk a day.    Discussed medications with patient.  Due to patient's age I am hesitant to prescribe a biphosphonate.  Discussed the importance of fall precautions with patient.

## 2023-12-05 NOTE — ASSESSMENT & PLAN NOTE
Chronic stable.    Patient was diagnosed with bilateral pulmonary embolism in October of this year.  She reports that she recently stopped taking her Eliquis as she read the side effects and was hesitant to do so.    I had lengthy discussion with the patient about the risks and benefits as well as the incidence and occurrence of adverse effects with the Eliquis.    Patient is agreeable to continue the Eliquis for total of 3 months.  Patient to follow-up at that time.    Patient was sent home with oxygen from the hospital.  She has no longer needing this.  Will provide patient with letter for DME company to  oxygen.

## 2023-12-05 NOTE — PROGRESS NOTES
"  Chief Complaint   Patient presents with   • Medication Management   • Osteoporosis   • Orders Needed     Discontinue oxygen                                                                                                                                        HPI:   Carie presents today with the following.    Problem   Age-Related Osteoporosis Without Current Pathological Fracture   Bilateral Pulmonary Embolism (Hcc)   Ckd Stage G3a/A1, Gfr 45-59 and Albumin Creatinine Ratio <30 Mg/G (Hcc)   Htn (Hypertension)       Current Outpatient Medications   Medication Sig Dispense Refill   • apixaban (ELIQUIS) 5mg Tab Take 1 Tablet by mouth 2 times a day. Indications: DVT/PE 60 Tablet 1   • vitamin D3 (CHOLECALCIFEROL) 1000 Unit (25 mcg) Tab Take 1,000 Units by mouth every day.     • olmesartan (BENICAR) 20 MG Tab Take 40 mg by mouth every day.     • loratadine (CLARITIN) 10 MG Tab Take 10 mg by mouth 1 time a day as needed.       No current facility-administered medications for this visit.       Allergies as of 12/04/2023 - Reviewed 12/04/2023   Allergen Reaction Noted   • Pcn [penicillins] Unspecified 07/14/2009        ROS:  All systems negative expect as addressed in assessment and plan.     BP 90/60 (BP Location: Left arm, Patient Position: Sitting)   Pulse 94   Temp 36.9 °C (98.5 °F) (Temporal)   Resp 16   Ht 1.626 m (5' 4\")   Wt 58.5 kg (129 lb)   SpO2 96%   BMI 22.14 kg/m²       Physical Exam  Vitals reviewed.   Constitutional:       Appearance: Normal appearance.   HENT:      Head: Normocephalic and atraumatic.      Mouth/Throat:      Mouth: Mucous membranes are moist.   Eyes:      Extraocular Movements: Extraocular movements intact.      Conjunctiva/sclera: Conjunctivae normal.   Cardiovascular:      Rate and Rhythm: Normal rate and regular rhythm.      Heart sounds: Normal heart sounds.   Pulmonary:      Effort: Pulmonary effort is normal.      Breath sounds: Normal breath sounds.   Musculoskeletal:         " General: Normal range of motion.      Cervical back: Normal range of motion.   Skin:     General: Skin is warm and dry.   Neurological:      General: No focal deficit present.      Mental Status: She is alert and oriented to person, place, and time.   Psychiatric:         Mood and Affect: Mood normal.         Behavior: Behavior normal.         Thought Content: Thought content normal.       Assessment and Plan:  89 y.o. female with the following issues.    1. Acute pulmonary embolism, unspecified pulmonary embolism type, unspecified whether acute cor pulmonale present (HCC)  apixaban (ELIQUIS) 5mg Tab    Basic Metabolic Panel    CBC WITH DIFFERENTIAL      2. Age-related osteoporosis without current pathological fracture        3. Bilateral pulmonary embolism (HCC)        4. CKD stage G3a/A1, GFR 45-59 and albumin creatinine ratio <30 mg/g (HCC)        5. Primary hypertension             Age-related osteoporosis without current pathological fracture  Chronic and stable.  Patient recently had DEXA scan which did show osteo porosis in both her left forearm and left femur.    Discussed with patient methods to help increase bone density including weightbearing exercise, vitamin D supplementation and calcium supplementation.    Patient does take vitamin D at this time and she states that she drinks at least 1 glass of milk a day.    Discussed medications with patient.  Due to patient's age I am hesitant to prescribe a biphosphonate.  Discussed the importance of fall precautions with patient.    Bilateral pulmonary embolism (HCC)  Chronic stable.    Patient was diagnosed with bilateral pulmonary embolism in October of this year.  She reports that she recently stopped taking her Eliquis as she read the side effects and was hesitant to do so.    I had lengthy discussion with the patient about the risks and benefits as well as the incidence and occurrence of adverse effects with the Eliquis.    Patient is agreeable to continue  the Eliquis for total of 3 months.  Patient to follow-up at that time.    Patient was sent home with oxygen from the hospital.  She has no longer needing this.  Will provide patient with letter for [employer] company to  oxygen.    CKD stage G3a/A1, GFR 45-59 and albumin creatinine ratio <30 mg/g (HCC)  Chronic stable.  Patient recently had acute kidney injury.  By the time she was discharged from the hospital her GFR did recover to 48 and her creatinine was back within normal limits.    Will order labs to monitor every 3 to 12 months.    HTN (hypertension)  Chronic stable.    Continue olmesartan 40 mg daily.      Return in about 7 weeks (around 1/22/2024) for Follow-up pulmonary embolism.    I spent a total of 41 minutes with record review, exam, and communication with the patient, communication with other providers, and documentation of this encounter. This does not include time spent on separately billable procedures/tests.     Please note that this dictation was created using voice recognition software. I have worked with consultants from the vendor as well as technical experts from [employer] to optimize the interface. I have made every reasonable attempt to correct obvious errors, but I expect that there are errors of grammar and possibly content that I did not discover before finalizing the note.

## 2024-01-22 ENCOUNTER — APPOINTMENT (OUTPATIENT)
Dept: MEDICAL GROUP | Facility: PHYSICIAN GROUP | Age: 89
End: 2024-01-22
Payer: MEDICARE

## 2024-01-29 ENCOUNTER — OFFICE VISIT (OUTPATIENT)
Dept: MEDICAL GROUP | Facility: PHYSICIAN GROUP | Age: 89
End: 2024-01-29
Payer: MEDICARE

## 2024-01-29 VITALS
RESPIRATION RATE: 18 BRPM | OXYGEN SATURATION: 94 % | BODY MASS INDEX: 22.13 KG/M2 | DIASTOLIC BLOOD PRESSURE: 60 MMHG | WEIGHT: 129.6 LBS | HEART RATE: 80 BPM | SYSTOLIC BLOOD PRESSURE: 116 MMHG | TEMPERATURE: 99 F | HEIGHT: 64 IN

## 2024-01-29 DIAGNOSIS — K21.00 GASTROESOPHAGEAL REFLUX DISEASE WITH ESOPHAGITIS WITHOUT HEMORRHAGE: ICD-10-CM

## 2024-01-29 DIAGNOSIS — I26.99 ACUTE PULMONARY EMBOLISM, UNSPECIFIED PULMONARY EMBOLISM TYPE, UNSPECIFIED WHETHER ACUTE COR PULMONALE PRESENT (HCC): ICD-10-CM

## 2024-01-29 DIAGNOSIS — I10 PRIMARY HYPERTENSION: ICD-10-CM

## 2024-01-29 DIAGNOSIS — M81.0 AGE-RELATED OSTEOPOROSIS WITHOUT CURRENT PATHOLOGICAL FRACTURE: ICD-10-CM

## 2024-01-29 DIAGNOSIS — F32.9 REACTIVE DEPRESSION: ICD-10-CM

## 2024-01-29 PROCEDURE — 3074F SYST BP LT 130 MM HG: CPT | Performed by: NURSE PRACTITIONER

## 2024-01-29 PROCEDURE — 3078F DIAST BP <80 MM HG: CPT | Performed by: NURSE PRACTITIONER

## 2024-01-29 PROCEDURE — 99214 OFFICE O/P EST MOD 30 MIN: CPT | Performed by: NURSE PRACTITIONER

## 2024-01-29 RX ORDER — EPINEPHRINE 1 MG/ML(1)
0.5 AMPUL (ML) INJECTION PRN
Status: CANCELLED | OUTPATIENT
Start: 2024-01-29

## 2024-01-29 RX ORDER — DIPHENHYDRAMINE HYDROCHLORIDE 50 MG/ML
50 INJECTION INTRAMUSCULAR; INTRAVENOUS PRN
Status: CANCELLED | OUTPATIENT
Start: 2024-01-29

## 2024-01-29 RX ORDER — METHYLPREDNISOLONE SODIUM SUCCINATE 125 MG/2ML
125 INJECTION, POWDER, LYOPHILIZED, FOR SOLUTION INTRAMUSCULAR; INTRAVENOUS PRN
Status: CANCELLED | OUTPATIENT
Start: 2024-01-29

## 2024-01-29 RX ORDER — ALBUTEROL SULFATE 90 UG/1
2 AEROSOL, METERED RESPIRATORY (INHALATION) PRN
Status: CANCELLED | OUTPATIENT
Start: 2024-01-29

## 2024-01-29 ASSESSMENT — FIBROSIS 4 INDEX: FIB4 SCORE: 3.03

## 2024-01-29 ASSESSMENT — PATIENT HEALTH QUESTIONNAIRE - PHQ9
SUM OF ALL RESPONSES TO PHQ QUESTIONS 1-9: 11
CLINICAL INTERPRETATION OF PHQ2 SCORE: 3
5. POOR APPETITE OR OVEREATING: 2 - MORE THAN HALF THE DAYS

## 2024-01-29 NOTE — PROGRESS NOTES
"Family Nurse Practitioner Student Note    Cosigner/Preceptor: RAISA Brown    Chief Complaint: follow up chronic health problems                                                                                                                                HPI:   Carie presents today with the following.    Current Outpatient Medications   Medication Sig Dispense Refill    vitamin D3 (CHOLECALCIFEROL) 1000 Unit (25 mcg) Tab Take 1,000 Units by mouth every day.      olmesartan (BENICAR) 20 MG Tab Take 40 mg by mouth every day.      loratadine (CLARITIN) 10 MG Tab Take 10 mg by mouth 1 time a day as needed.       No current facility-administered medications for this visit.       Allergies as of 01/29/2024 - Reviewed 01/29/2024   Allergen Reaction Noted    Pcn [penicillins] Unspecified 07/14/2009        ROS:  All systems negative expect as addressed in assessment and plan.     /60 (BP Location: Left arm, Patient Position: Sitting, BP Cuff Size: Adult)   Pulse 80   Temp 37.2 °C (99 °F) (Temporal)   Resp 18   Ht 1.626 m (5' 4\")   Wt 58.8 kg (129 lb 9.6 oz)   SpO2 94%   BMI 22.25 kg/m²     Physical Exam  Vitals reviewed.   Constitutional:       Appearance: Normal appearance.   HENT:      Head: Normocephalic and atraumatic.      Mouth/Throat:      Mouth: Mucous membranes are moist.   Eyes:      Extraocular Movements: Extraocular movements intact.      Conjunctiva/sclera: Conjunctivae normal.   Pulmonary:      Effort: Pulmonary effort is normal.   Musculoskeletal:         General: Normal range of motion.      Cervical back: Normal range of motion.   Skin:     General: Skin is warm and dry.   Neurological:      General: No focal deficit present.      Mental Status: She is alert and oriented to person, place, and time.   Psychiatric:         Mood and Affect: Mood normal.         Behavior: Behavior normal.         Thought Content: Thought content normal.         Assessment and Plan:  89 y.o. female with the " following issues.    Problem List Items Addressed This Visit      1. Acute pulmonary embolism, unspecified pulmonary embolism type, unspecified whether acute cor pulmonale present (HCC)  Chronic stable. Patient denies shortness of breath, no orthopnea, and no chest pain.     Discontinue apixaban 5 mg BID. Discussed doing a baby aspirin daily.    2. Primary hypertension  Chronic stable. Patient's bp today in clinic is 116/60. Patient denies chest pain/headaches.     Continue olmesartan 40 mg daily.    3. Gastroesophageal reflux disease with esophagitis without hemorrhage  Chronic unchanged. Patient takes TUMS occasionally for discomfort. This does help her GERD.     Continue use of TUMS prn.     4. Reactive depression  This is a new finding. Patient's dog recently passed away within the last week. Patient is grieving at this time. Emotional support provided. Discussed potentially a low dose anti-depressant or something to help her sleep because patient is not sleeping at night due to her grief. Declines any interventions at this time.     5. Age-related osteoporosis without current pathological fracture  Chronic unstable. Discussed OTC calcium and vitamin d to help with osteoporosis. At this point in time, will start denosumab injections every 6 months. Discussed this plan with patient and she is agreeable to this plan. Education about weight-bearing exercises also discussed with patient.     Order placed for denosumab therapy.    Return in about 6 months (around 7/29/2024).

## 2024-02-12 ENCOUNTER — APPOINTMENT (OUTPATIENT)
Dept: ONCOLOGY | Facility: MEDICAL CENTER | Age: 89
End: 2024-02-12
Attending: NURSE PRACTITIONER
Payer: MEDICARE

## 2024-03-18 ENCOUNTER — OUTPATIENT INFUSION SERVICES (OUTPATIENT)
Dept: ONCOLOGY | Facility: MEDICAL CENTER | Age: 89
End: 2024-03-18
Attending: NURSE PRACTITIONER
Payer: MEDICARE

## 2024-03-18 VITALS
TEMPERATURE: 97 F | OXYGEN SATURATION: 96 % | BODY MASS INDEX: 25.54 KG/M2 | SYSTOLIC BLOOD PRESSURE: 160 MMHG | DIASTOLIC BLOOD PRESSURE: 86 MMHG | HEART RATE: 98 BPM | HEIGHT: 60 IN | WEIGHT: 130.07 LBS | RESPIRATION RATE: 18 BRPM

## 2024-03-18 DIAGNOSIS — M81.0 AGE-RELATED OSTEOPOROSIS WITHOUT CURRENT PATHOLOGICAL FRACTURE: ICD-10-CM

## 2024-03-18 LAB
CA-I BLD ISE-SCNC: 1.32 MMOL/L (ref 1.1–1.3)
CREAT BLD-MCNC: 1.6 MG/DL (ref 0.5–1.4)

## 2024-03-18 PROCEDURE — 700111 HCHG RX REV CODE 636 W/ 250 OVERRIDE (IP): Mod: JZ,JG | Performed by: NURSE PRACTITIONER

## 2024-03-18 PROCEDURE — 82330 ASSAY OF CALCIUM: CPT

## 2024-03-18 PROCEDURE — 36415 COLL VENOUS BLD VENIPUNCTURE: CPT

## 2024-03-18 PROCEDURE — 82565 ASSAY OF CREATININE: CPT

## 2024-03-18 PROCEDURE — 96372 THER/PROPH/DIAG INJ SC/IM: CPT

## 2024-03-18 RX ORDER — EPINEPHRINE 1 MG/ML(1)
0.5 AMPUL (ML) INJECTION PRN
OUTPATIENT
Start: 2024-09-14

## 2024-03-18 RX ORDER — METHYLPREDNISOLONE SODIUM SUCCINATE 125 MG/2ML
125 INJECTION, POWDER, LYOPHILIZED, FOR SOLUTION INTRAMUSCULAR; INTRAVENOUS PRN
OUTPATIENT
Start: 2024-09-14

## 2024-03-18 RX ORDER — DIPHENHYDRAMINE HYDROCHLORIDE 50 MG/ML
50 INJECTION INTRAMUSCULAR; INTRAVENOUS PRN
OUTPATIENT
Start: 2024-09-14

## 2024-03-18 RX ORDER — ALBUTEROL SULFATE 90 UG/1
2 AEROSOL, METERED RESPIRATORY (INHALATION) PRN
OUTPATIENT
Start: 2024-09-14

## 2024-03-18 RX ADMIN — DENOSUMAB 60 MG: 60 INJECTION SUBCUTANEOUS at 15:35

## 2024-03-18 ASSESSMENT — FIBROSIS 4 INDEX: FIB4 SCORE: 3.03

## 2024-03-18 ASSESSMENT — PAIN DESCRIPTION - PAIN TYPE: TYPE: CHRONIC PAIN

## 2024-03-18 NOTE — PROGRESS NOTES
Pt arrived ambulatory for Q 6 month Prolia injection. She denies any s/s acute infection or illness, denies dental procedures in the past 4 weeks or upcoming dental procedures, denies s/s of hypocalcimia.  Pt confirms taking vitamin D at home, but was told not to take Calcium by provider.    Istat Ca/Cr lab drawn from right hand, sterile gauze and coban to site.  Lab parameters met, Prolia injected Sub Q to back right arm per MAR, bandaid applied to site.  Pt tolerated well, discharged in no apparent distress, scheduling emailed to add next appt in 6 months.

## 2024-07-19 ENCOUNTER — HOSPITAL ENCOUNTER (OUTPATIENT)
Dept: LAB | Facility: MEDICAL CENTER | Age: 89
End: 2024-07-19
Attending: INTERNAL MEDICINE
Payer: MEDICARE

## 2024-07-19 LAB
ALBUMIN SERPL BCP-MCNC: 3.8 G/DL (ref 3.2–4.9)
ALBUMIN/GLOB SERPL: 1.2 G/DL
ALP SERPL-CCNC: 63 U/L (ref 30–99)
ALT SERPL-CCNC: 9 U/L (ref 2–50)
ANION GAP SERPL CALC-SCNC: 15 MMOL/L (ref 7–16)
AST SERPL-CCNC: 17 U/L (ref 12–45)
BILIRUB SERPL-MCNC: 0.9 MG/DL (ref 0.1–1.5)
BUN SERPL-MCNC: 49 MG/DL (ref 8–22)
CALCIUM ALBUM COR SERPL-MCNC: 10.8 MG/DL (ref 8.5–10.5)
CALCIUM SERPL-MCNC: 10.6 MG/DL (ref 8.5–10.5)
CHLORIDE SERPL-SCNC: 98 MMOL/L (ref 96–112)
CHOLEST SERPL-MCNC: 154 MG/DL (ref 100–199)
CO2 SERPL-SCNC: 21 MMOL/L (ref 20–33)
CREAT SERPL-MCNC: 1.81 MG/DL (ref 0.5–1.4)
FASTING STATUS PATIENT QL REPORTED: NORMAL
GFR SERPLBLD CREATININE-BSD FMLA CKD-EPI: 26 ML/MIN/1.73 M 2
GLOBULIN SER CALC-MCNC: 3.1 G/DL (ref 1.9–3.5)
GLUCOSE SERPL-MCNC: 98 MG/DL (ref 65–99)
HDLC SERPL-MCNC: 37 MG/DL
LDLC SERPL CALC-MCNC: 90 MG/DL
POTASSIUM SERPL-SCNC: 4.1 MMOL/L (ref 3.6–5.5)
PROT SERPL-MCNC: 6.9 G/DL (ref 6–8.2)
SODIUM SERPL-SCNC: 134 MMOL/L (ref 135–145)
TRIGL SERPL-MCNC: 137 MG/DL (ref 0–149)

## 2024-07-19 PROCEDURE — 80061 LIPID PANEL: CPT

## 2024-07-19 PROCEDURE — 80053 COMPREHEN METABOLIC PANEL: CPT

## 2024-07-19 PROCEDURE — 36415 COLL VENOUS BLD VENIPUNCTURE: CPT

## 2024-09-19 ENCOUNTER — OUTPATIENT INFUSION SERVICES (OUTPATIENT)
Dept: ONCOLOGY | Facility: MEDICAL CENTER | Age: 89
End: 2024-09-19
Attending: NURSE PRACTITIONER
Payer: MEDICARE

## 2024-09-19 VITALS
HEART RATE: 101 BPM | TEMPERATURE: 97.4 F | DIASTOLIC BLOOD PRESSURE: 78 MMHG | RESPIRATION RATE: 18 BRPM | HEIGHT: 60 IN | WEIGHT: 127.21 LBS | OXYGEN SATURATION: 94 % | SYSTOLIC BLOOD PRESSURE: 131 MMHG | BODY MASS INDEX: 24.97 KG/M2

## 2024-09-19 DIAGNOSIS — M81.0 AGE-RELATED OSTEOPOROSIS WITHOUT CURRENT PATHOLOGICAL FRACTURE: ICD-10-CM

## 2024-09-19 LAB
CA-I BLD ISE-SCNC: 1.25 MMOL/L (ref 1.1–1.3)
CREAT BLD-MCNC: 1.7 MG/DL (ref 0.5–1.4)

## 2024-09-19 PROCEDURE — 82330 ASSAY OF CALCIUM: CPT

## 2024-09-19 PROCEDURE — 82565 ASSAY OF CREATININE: CPT

## 2024-09-19 PROCEDURE — 36415 COLL VENOUS BLD VENIPUNCTURE: CPT

## 2024-09-19 PROCEDURE — 96372 THER/PROPH/DIAG INJ SC/IM: CPT

## 2024-09-19 PROCEDURE — 700111 HCHG RX REV CODE 636 W/ 250 OVERRIDE (IP): Mod: JZ,JG | Performed by: NURSE PRACTITIONER

## 2024-09-19 RX ORDER — EPINEPHRINE 1 MG/ML(1)
0.5 AMPUL (ML) INJECTION PRN
OUTPATIENT
Start: 2025-03-13

## 2024-09-19 RX ORDER — METHYLPREDNISOLONE SODIUM SUCCINATE 125 MG/2ML
125 INJECTION, POWDER, LYOPHILIZED, FOR SOLUTION INTRAMUSCULAR; INTRAVENOUS PRN
OUTPATIENT
Start: 2025-03-13

## 2024-09-19 RX ORDER — DIPHENHYDRAMINE HYDROCHLORIDE 50 MG/ML
50 INJECTION INTRAMUSCULAR; INTRAVENOUS PRN
OUTPATIENT
Start: 2025-03-13

## 2024-09-19 RX ORDER — ALBUTEROL SULFATE 90 UG/1
2 INHALANT RESPIRATORY (INHALATION) PRN
OUTPATIENT
Start: 2025-03-13

## 2024-09-19 RX ADMIN — DENOSUMAB 60 MG: 60 INJECTION SUBCUTANEOUS at 14:09

## 2024-09-19 ASSESSMENT — FIBROSIS 4 INDEX: FIB4 SCORE: 2.09

## 2024-09-19 NOTE — PROGRESS NOTES
Pt presented to [facility] for Prolia injection. POC discussed and pt verbalized understanding. Pt confirms taking VitD/calcium supplements, denies recent or planned dental/oral procedures in the last month or the next month, and denies s/s of hypocalcemia or infection today. Labs drawn from left AC without difficulty; site covered with sterile gauze/coban and pt tolerated well. Labs reviewed by pharmacy and Prolia injection administered per MAR subQ in the thigh. Pt refused to have injection administered in her abdomen or the back of the arms. Band-aid applied to site and pt tolerated well. No s/s of adverse reactions or complications noted.  emailed and pt confirms MyChart access. Pt discharged to self care in [facility].

## 2025-03-20 ENCOUNTER — TELEPHONE (OUTPATIENT)
Dept: ONCOLOGY | Facility: MEDICAL CENTER | Age: OVER 89
End: 2025-03-20

## 2025-03-21 NOTE — TELEPHONE ENCOUNTER
Called and lvm for pt to let her know about her new upcoming infusion appt for her prolia. I also reached out to her Daughter to confirm this appt. She stated that she would call and discuss this appt with her mom and make sure that she is wanting to come in for this and that she is aware of when it is. She stated that her battery in her car was dead today and so she was not able to come in for this appt. She let me know that either she or her mom will call back to let us know if they will keep the appt.

## 2025-04-10 ENCOUNTER — OUTPATIENT INFUSION SERVICES (OUTPATIENT)
Dept: ONCOLOGY | Facility: MEDICAL CENTER | Age: OVER 89
End: 2025-04-10
Attending: FAMILY MEDICINE
Payer: MEDICARE